# Patient Record
Sex: FEMALE | Race: WHITE | NOT HISPANIC OR LATINO | Employment: UNEMPLOYED | URBAN - METROPOLITAN AREA
[De-identification: names, ages, dates, MRNs, and addresses within clinical notes are randomized per-mention and may not be internally consistent; named-entity substitution may affect disease eponyms.]

---

## 2017-11-10 ENCOUNTER — HOSPITAL ENCOUNTER (OUTPATIENT)
Dept: ULTRASOUND IMAGING | Facility: HOSPITAL | Age: 27
Discharge: HOME | End: 2017-11-10
Attending: NURSE PRACTITIONER | Admitting: NURSE PRACTITIONER

## 2018-01-03 ENCOUNTER — CONVERSION ENCOUNTER (OUTPATIENT)
Dept: BEHAVIORAL HEALTH | Facility: OTHER | Age: 28
End: 2018-01-03

## 2019-06-04 VITALS
DIASTOLIC BLOOD PRESSURE: 73 MMHG | SYSTOLIC BLOOD PRESSURE: 114 MMHG | HEIGHT: 70 IN | HEART RATE: 100 BPM | WEIGHT: 293 LBS | BODY MASS INDEX: 41.95 KG/M2 | OXYGEN SATURATION: 99 %

## 2019-06-27 ENCOUNTER — OFFICE VISIT (OUTPATIENT)
Dept: PSYCHIATRY | Facility: CLINIC | Age: 29
End: 2019-06-27

## 2019-06-27 DIAGNOSIS — F43.12 CHRONIC POST-TRAUMATIC STRESS DISORDER (PTSD): ICD-10-CM

## 2019-06-27 DIAGNOSIS — F48.2 PSEUDOBULBAR AFFECT: ICD-10-CM

## 2019-06-27 DIAGNOSIS — F51.05 INSOMNIA DUE TO OTHER MENTAL DISORDER: ICD-10-CM

## 2019-06-27 DIAGNOSIS — F99 INSOMNIA DUE TO OTHER MENTAL DISORDER: ICD-10-CM

## 2019-06-27 DIAGNOSIS — F31.5 BIPOLAR I DISORDER, MOST RECENT EPISODE (OR CURRENT) DEPRESSED, SEVERE, SPECIFIED AS WITH PSYCHOTIC BEHAVIOR (HCC): Primary | ICD-10-CM

## 2019-06-27 PROCEDURE — 99214 OFFICE O/P EST MOD 30 MIN: CPT | Performed by: PSYCHIATRY & NEUROLOGY

## 2019-06-27 RX ORDER — NAPROXEN 500 MG/1
TABLET ORAL
COMMUNITY
Start: 2015-04-22 | End: 2020-04-14

## 2019-06-27 RX ORDER — CLONAZEPAM 1 MG/1
1 TABLET ORAL 2 TIMES DAILY PRN
Qty: 60 TABLET | Refills: 2 | Status: SHIPPED | OUTPATIENT
Start: 2019-06-27 | End: 2019-09-25 | Stop reason: SDUPTHER

## 2019-06-27 RX ORDER — CLONAZEPAM 2 MG/1
TABLET ORAL
COMMUNITY
Start: 2018-01-03 | End: 2019-06-27

## 2019-06-27 RX ORDER — GABAPENTIN 300 MG/1
300 CAPSULE ORAL 3 TIMES DAILY
Qty: 90 CAPSULE | Refills: 2 | Status: SHIPPED | OUTPATIENT
Start: 2019-06-27 | End: 2019-08-29

## 2019-06-27 RX ORDER — BUPROPION HYDROCHLORIDE 150 MG/1
TABLET ORAL EVERY 24 HOURS
COMMUNITY
Start: 2018-11-06 | End: 2019-06-27

## 2019-06-27 RX ORDER — OMEPRAZOLE 40 MG/1
CAPSULE, DELAYED RELEASE ORAL
COMMUNITY
Start: 2017-11-13 | End: 2023-03-27 | Stop reason: SDUPTHER

## 2019-06-27 RX ORDER — BUPROPION HYDROCHLORIDE 300 MG/1
TABLET ORAL EVERY 24 HOURS
COMMUNITY
Start: 2018-07-31 | End: 2019-06-27

## 2019-06-27 RX ORDER — BENZONATATE 100 MG/1
CAPSULE ORAL
Refills: 0 | COMMUNITY
Start: 2019-05-05 | End: 2020-04-14

## 2019-06-27 RX ORDER — GABAPENTIN 300 MG/1
300 CAPSULE ORAL 3 TIMES DAILY
Refills: 0 | COMMUNITY
Start: 2019-05-27 | End: 2019-06-27 | Stop reason: SDUPTHER

## 2019-06-27 RX ORDER — DOXYCYCLINE 100 MG/1
100 CAPSULE ORAL 2 TIMES DAILY
Refills: 0 | COMMUNITY
Start: 2019-05-31 | End: 2020-04-14

## 2019-06-27 RX ORDER — MECLIZINE HYDROCHLORIDE 25 MG/1
25 TABLET ORAL EVERY 6 HOURS PRN
Refills: 0 | COMMUNITY
Start: 2019-04-02 | End: 2020-04-14

## 2019-06-27 RX ORDER — ALBUTEROL SULFATE 90 UG/1
AEROSOL, METERED RESPIRATORY (INHALATION)
Refills: 0 | COMMUNITY
Start: 2019-05-05 | End: 2020-04-14
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
## 2019-06-27 NOTE — PATIENT INSTRUCTIONS
Bipolar 1 Disorder  Bipolar 1 disorder is a mental health disorder in which a person has episodes of emotional highs (kimberly), and may also have episodes of emotional lows (depression) in addition to highs. Bipolar 1 disorder is different from other bipolar disorders because it involves extreme manic episodes. These episodes last at least one week or involve symptoms that are so severe that hospitalization is needed to keep the person safe.  What increases the risk?  The cause of this condition is not known. However, certain factors make you more likely to have bipolar disorder, such as:  · Having a family member with the disorder.  · An imbalance of certain chemicals in the brain (neurotransmitters).  · Stress, such as illness, financial problems, or a death.  · Certain conditions that affect the brain or spinal cord (neurologic conditions).  · Brain injury (trauma).  · Having another mental health disorder, such as:  ? Obsessive compulsive disorder.  ? Schizophrenia.    What are the signs or symptoms?  Symptoms of kimberly include:  · Very high self-esteem or self-confidence.  · Decreased need for sleep.  · Unusual talkativeness or feeling a need to keep talking. Speech may be very fast. It may seem like you cannot stop talking.  · Racing thoughts or constant talking, with quick shifts between topics that may or may not be related (flight of ideas).  · Decreased ability to focus or concentrate.  · Increased purposeful activity, such as work, studies, or social activity.  · Increased nonproductive activity. This could be pacing, squirming and fidgeting, or finger and toe tapping.  · Impulsive behavior and poor judgment. This may result in high-risk activities, such as having unprotected sex or spending a lot of money.    Symptoms of depression include:  · Feeling sad, hopeless, or helpless.  · Frequent or uncontrollable crying.  · Lack of feeling or caring about anything.  · Sleeping too much.  · Moving more slowly  than usual.  · Not being able to enjoy things you used to enjoy.  · Wanting to be alone all the time.  · Feeling guilty or worthless.  · Lack of energy or motivation.  · Trouble concentrating or remembering.  · Trouble making decisions.  · Increased appetite.  · Thoughts of death, or the desire to harm yourself.    Sometimes, you may have a mixed mood. This means having symptoms of depression and kimberly. Stress can make symptoms worse.  How is this diagnosed?  To diagnose bipolar disorder, your health care provider may ask about your:  · Emotional episodes.  · Medical history.  · Alcohol and drug use. This includes prescription medicines. Certain medical conditions and substances can cause symptoms that seem like bipolar disorder (secondary bipolar disorder).    How is this treated?  Bipolar disorder is a long-term (chronic) illness. It is best controlled with ongoing (continuous) treatment rather than treatment only when symptoms occur. Treatment may include:  · Medicine. Medicine can be prescribed by a provider who specializes in treating mental disorders (psychiatrist).  ? Medicines called mood stabilizers are usually prescribed.  ? If symptoms occur even while taking a mood stabilizer, other medicines may be added.  · Psychotherapy. Some forms of talk therapy, such as cognitive-behavioral therapy (CBT), can provide support, education, and guidance.  · Coping methods, such as journaling or relaxation exercises. These may include:  ? Yoga.  ? Meditation.  ? Deep breathing.  · Lifestyle changes, such as:  ? Limiting alcohol and drug use.  ? Exercising regularly.  ? Getting plenty of sleep.  ? Making healthy eating choices.    A combination of medicine, talk therapy, and coping methods is best. A procedure in which electricity is applied to the brain through the scalp (electroconvulsive therapy) may be used in cases of severe kimberly when medicine and psychotherapy work too slowly or do not work.  Follow these  instructions at home:  Activity    · Return to your normal activities as told by your health care provider.  · Find activities that you enjoy, and make time to do them.  · Exercise regularly as told by your health care provider.  Lifestyle  · Limit alcohol intake to no more than 1 drink a day for nonpregnant women and 2 drinks a day for men. One drink equals 12 oz of beer, 5 oz of wine, or 1½ oz of hard liquor.  · Follow a set schedule for eating and sleeping.  · Eat a balanced diet that includes fresh fruits and vegetables, whole grains, low-fat dairy, and lean meat.  · Get 7-8 hours of sleep each night.  General instructions  · Take over-the-counter and prescription medicines only as told by your health care provider.  · Think about joining a support group. Your health care provider may be able to recommend a support group.  · Talk with your family and loved ones about your treatment goals and how they can help.  · Keep all follow-up visits as told by your health care provider. This is important.  Where to find more information  For more information about bipolar disorder, visit the following websites:  · National [place] on Mental Illness: www.agustín.org  · [place] [employer] [place] of [employer]: www.nimh.nih.gov    Contact a health care provider if:  · Your symptoms get worse.  · You have side effects from your medicine, and they get worse.  · You have trouble sleeping.  · You have trouble doing daily activities.  · You feel unsafe in your surroundings.  · You are dealing with substance abuse.  Get help right away if:  · You have new symptoms.  · You have thoughts about harming yourself.  · You self-harm.  This information is not intended to replace advice given to you by your health care provider. Make sure you discuss any questions you have with your health care provider.  Document Released: 03/26/2002 Document Revised: 08/13/2017 Document Reviewed: 08/17/2017  Elsevier Interactive Patient Education © 2019  [employer]

## 2019-07-19 ENCOUNTER — APPOINTMENT (OUTPATIENT)
Dept: GENERAL RADIOLOGY | Facility: HOSPITAL | Age: 29
End: 2019-07-19

## 2019-07-19 ENCOUNTER — HOSPITAL ENCOUNTER (EMERGENCY)
Facility: HOSPITAL | Age: 29
Discharge: HOME | End: 2019-07-19
Admitting: EMERGENCY MEDICINE

## 2019-07-19 VITALS
SYSTOLIC BLOOD PRESSURE: 130 MMHG | WEIGHT: 284.39 LBS | DIASTOLIC BLOOD PRESSURE: 83 MMHG | HEART RATE: 98 BPM | RESPIRATION RATE: 16 BRPM | TEMPERATURE: 98.2 F | OXYGEN SATURATION: 98 % | HEIGHT: 70 IN | BODY MASS INDEX: 40.71 KG/M2

## 2019-07-19 DIAGNOSIS — S93.402A SPRAIN OF LEFT ANKLE, UNSPECIFIED LIGAMENT, INITIAL ENCOUNTER: Primary | ICD-10-CM

## 2019-07-19 PROCEDURE — 99283 EMERGENCY DEPT VISIT LOW MDM: CPT

## 2019-07-19 PROCEDURE — 73610 X-RAY EXAM OF ANKLE: CPT

## 2019-07-19 PROCEDURE — 73630 X-RAY EXAM OF FOOT: CPT

## 2019-08-20 ENCOUNTER — TELEPHONE (OUTPATIENT)
Dept: PSYCHIATRY | Facility: CLINIC | Age: 29
End: 2019-08-20

## 2019-08-29 DIAGNOSIS — F31.5 BIPOLAR I DISORDER, MOST RECENT EPISODE (OR CURRENT) DEPRESSED, SEVERE, SPECIFIED AS WITH PSYCHOTIC BEHAVIOR (HCC): ICD-10-CM

## 2019-08-29 RX ORDER — QUETIAPINE FUMARATE 300 MG/1
600 TABLET, FILM COATED ORAL NIGHTLY
Qty: 60 TABLET | Refills: 0 | Status: SHIPPED | OUTPATIENT
Start: 2019-08-29 | End: 2019-09-25 | Stop reason: SDUPTHER

## 2019-08-29 RX ORDER — TRAZODONE HYDROCHLORIDE 50 MG/1
50 TABLET ORAL NIGHTLY
COMMUNITY
End: 2019-08-29 | Stop reason: SDUPTHER

## 2019-08-29 RX ORDER — QUETIAPINE FUMARATE 300 MG/1
600 TABLET, FILM COATED ORAL NIGHTLY
COMMUNITY
End: 2019-08-29 | Stop reason: SDUPTHER

## 2019-08-29 RX ORDER — TRAZODONE HYDROCHLORIDE 50 MG/1
50 TABLET ORAL NIGHTLY
Qty: 30 TABLET | Refills: 0 | Status: SHIPPED | OUTPATIENT
Start: 2019-08-29 | End: 2019-09-25 | Stop reason: SDUPTHER

## 2019-08-29 RX ORDER — GABAPENTIN 300 MG/1
600 CAPSULE ORAL 3 TIMES DAILY
Qty: 90 CAPSULE | Refills: 2 | Status: SHIPPED | OUTPATIENT
Start: 2019-08-29 | End: 2019-09-25 | Stop reason: SDUPTHER

## 2019-08-29 NOTE — TELEPHONE ENCOUNTER
Patient was just discharged from [facility] and changed her meds. Discharge summary was imported to you and I have also pended the meds that were change. Patient is needing refills due to insurance not taking her doctor? Please advise

## 2019-09-09 ENCOUNTER — TELEPHONE (OUTPATIENT)
Dept: PSYCHIATRY | Facility: CLINIC | Age: 29
End: 2019-09-09

## 2019-09-21 DIAGNOSIS — F43.12 CHRONIC POST-TRAUMATIC STRESS DISORDER (PTSD): ICD-10-CM

## 2019-09-24 ENCOUNTER — APPOINTMENT (OUTPATIENT)
Dept: GENERAL RADIOLOGY | Facility: HOSPITAL | Age: 29
End: 2019-09-24

## 2019-09-24 ENCOUNTER — HOSPITAL ENCOUNTER (EMERGENCY)
Facility: HOSPITAL | Age: 29
Discharge: HOME | End: 2019-09-24
Attending: EMERGENCY MEDICINE | Admitting: EMERGENCY MEDICINE

## 2019-09-24 ENCOUNTER — APPOINTMENT (OUTPATIENT)
Dept: MRI IMAGING | Facility: HOSPITAL | Age: 29
End: 2019-09-24

## 2019-09-24 VITALS
RESPIRATION RATE: 16 BRPM | WEIGHT: 289.24 LBS | OXYGEN SATURATION: 99 % | HEART RATE: 72 BPM | HEIGHT: 70 IN | BODY MASS INDEX: 41.41 KG/M2 | DIASTOLIC BLOOD PRESSURE: 58 MMHG | SYSTOLIC BLOOD PRESSURE: 108 MMHG | TEMPERATURE: 98 F

## 2019-09-24 DIAGNOSIS — R20.2 PARESTHESIAS: Primary | ICD-10-CM

## 2019-09-24 LAB
ABO GROUP BLD: NORMAL
ALBUMIN SERPL-MCNC: 3.4 G/DL (ref 3.5–4.8)
ALBUMIN/GLOB SERPL: 1.1 G/DL (ref 1–1.7)
ALP SERPL-CCNC: 61 U/L (ref 32–91)
ALT SERPL W P-5'-P-CCNC: 21 U/L (ref 14–54)
ANION GAP SERPL CALCULATED.3IONS-SCNC: 12.7 MMOL/L (ref 5–15)
AST SERPL-CCNC: 18 U/L (ref 15–41)
BASOPHILS # BLD AUTO: 0.1 10*3/MM3 (ref 0–0.2)
BASOPHILS NFR BLD AUTO: 0.7 % (ref 0–1.5)
BILIRUB SERPL-MCNC: 0.4 MG/DL (ref 0.3–1.2)
BLD GP AB SCN SERPL QL: NEGATIVE
BUN BLD-MCNC: 11 MG/DL (ref 8–20)
BUN/CREAT SERPL: 12.2 (ref 5.4–26.2)
CALCIUM SPEC-SCNC: 8.7 MG/DL (ref 8.9–10.3)
CHLORIDE SERPL-SCNC: 107 MMOL/L (ref 101–111)
CO2 SERPL-SCNC: 25 MMOL/L (ref 22–32)
CREAT BLD-MCNC: 0.9 MG/DL (ref 0.4–1)
DEPRECATED RDW RBC AUTO: 44.6 FL (ref 37–54)
EOSINOPHIL # BLD AUTO: 0.2 10*3/MM3 (ref 0–0.4)
EOSINOPHIL NFR BLD AUTO: 2.5 % (ref 0.3–6.2)
ERYTHROCYTE [DISTWIDTH] IN BLOOD BY AUTOMATED COUNT: 14.2 % (ref 12.3–15.4)
GFR SERPL CREATININE-BSD FRML MDRD: 74 ML/MIN/1.73
GLOBULIN UR ELPH-MCNC: 3.1 GM/DL (ref 2.5–3.8)
GLUCOSE BLD-MCNC: 108 MG/DL (ref 65–99)
GLUCOSE BLDC GLUCOMTR-MCNC: 116 MG/DL (ref 70–105)
HCT VFR BLD AUTO: 42.5 % (ref 34–46.6)
HGB BLD-MCNC: 14.6 G/DL (ref 12–15.9)
HOLD SPECIMEN: NORMAL
HOLD SPECIMEN: NORMAL
INR PPP: 0.9 (ref 0.9–1.1)
LYMPHOCYTES # BLD AUTO: 2.4 10*3/MM3 (ref 0.7–3.1)
LYMPHOCYTES NFR BLD AUTO: 24.6 % (ref 19.6–45.3)
MAGNESIUM SERPL-MCNC: 2 MG/DL (ref 1.8–2.5)
MCH RBC QN AUTO: 30.6 PG (ref 26.6–33)
MCHC RBC AUTO-ENTMCNC: 34.4 G/DL (ref 31.5–35.7)
MCV RBC AUTO: 89.1 FL (ref 79–97)
MONOCYTES # BLD AUTO: 0.6 10*3/MM3 (ref 0.1–0.9)
MONOCYTES NFR BLD AUTO: 6.3 % (ref 5–12)
NEUTROPHILS # BLD AUTO: 6.4 10*3/MM3 (ref 1.7–7)
NEUTROPHILS NFR BLD AUTO: 65.9 % (ref 42.7–76)
NRBC BLD AUTO-RTO: 0 /100 WBC (ref 0–0.2)
PLATELET # BLD AUTO: 229 10*3/MM3 (ref 140–450)
PMV BLD AUTO: 7.4 FL (ref 6–12)
POTASSIUM BLD-SCNC: 3.7 MMOL/L (ref 3.6–5.1)
PROT SERPL-MCNC: 6.5 G/DL (ref 6.1–7.9)
PROTHROMBIN TIME: 9.7 SECONDS (ref 9.6–11.7)
RBC # BLD AUTO: 4.77 10*6/MM3 (ref 3.77–5.28)
RH BLD: NEGATIVE
SODIUM BLD-SCNC: 141 MMOL/L (ref 136–144)
T&S EXPIRATION DATE: NORMAL
TROPONIN I SERPL-MCNC: <0.03 NG/ML (ref 0–0.03)
TSH SERPL DL<=0.05 MIU/L-ACNC: 0.67 UIU/ML (ref 0.34–5.6)
WBC NRBC COR # BLD: 9.6 10*3/MM3 (ref 3.4–10.8)
WHOLE BLOOD HOLD SPECIMEN: NORMAL
WHOLE BLOOD HOLD SPECIMEN: NORMAL

## 2019-09-24 PROCEDURE — 71045 X-RAY EXAM CHEST 1 VIEW: CPT

## 2019-09-24 PROCEDURE — 85610 PROTHROMBIN TIME: CPT | Performed by: NURSE PRACTITIONER

## 2019-09-24 PROCEDURE — 93005 ELECTROCARDIOGRAM TRACING: CPT | Performed by: NURSE PRACTITIONER

## 2019-09-24 PROCEDURE — 99284 EMERGENCY DEPT VISIT MOD MDM: CPT

## 2019-09-24 PROCEDURE — 85025 COMPLETE CBC W/AUTO DIFF WBC: CPT | Performed by: NURSE PRACTITIONER

## 2019-09-24 PROCEDURE — 80053 COMPREHEN METABOLIC PANEL: CPT | Performed by: NURSE PRACTITIONER

## 2019-09-24 PROCEDURE — 86901 BLOOD TYPING SEROLOGIC RH(D): CPT

## 2019-09-24 PROCEDURE — 86901 BLOOD TYPING SEROLOGIC RH(D): CPT | Performed by: NURSE PRACTITIONER

## 2019-09-24 PROCEDURE — 84443 ASSAY THYROID STIM HORMONE: CPT | Performed by: EMERGENCY MEDICINE

## 2019-09-24 PROCEDURE — 82962 GLUCOSE BLOOD TEST: CPT

## 2019-09-24 PROCEDURE — 86900 BLOOD TYPING SEROLOGIC ABO: CPT | Performed by: NURSE PRACTITIONER

## 2019-09-24 PROCEDURE — 84484 ASSAY OF TROPONIN QUANT: CPT | Performed by: EMERGENCY MEDICINE

## 2019-09-24 PROCEDURE — 86900 BLOOD TYPING SEROLOGIC ABO: CPT

## 2019-09-24 PROCEDURE — 83735 ASSAY OF MAGNESIUM: CPT | Performed by: EMERGENCY MEDICINE

## 2019-09-24 PROCEDURE — 70551 MRI BRAIN STEM W/O DYE: CPT

## 2019-09-24 PROCEDURE — 86850 RBC ANTIBODY SCREEN: CPT | Performed by: NURSE PRACTITIONER

## 2019-09-24 RX ORDER — CLONAZEPAM 1 MG/1
TABLET ORAL
Qty: 60 TABLET | OUTPATIENT
Start: 2019-09-24

## 2019-09-24 RX ORDER — SODIUM CHLORIDE 0.9 % (FLUSH) 0.9 %
10 SYRINGE (ML) INJECTION AS NEEDED
Status: DISCONTINUED | OUTPATIENT
Start: 2019-09-24 | End: 2019-09-24 | Stop reason: HOSPADM

## 2019-09-24 NOTE — DISCHARGE INSTRUCTIONS
Follow-up primary care doctor Dr. Naylor [id number] next week.  Return for weakness to extremities facial asymmetry speech difficulty vomiting chest pain passing out fevers redness swelling rash loss bladder bowel control or any other new or worse problems or concerns

## 2019-09-24 NOTE — ED PROVIDER NOTES
Subjective   Chief complaint left arm numbness and total body numbness    History of present illness 29-year-old female states her left arm is been numb for a week constantly and then this morning she was lightheaded and her whole body went numb for about 15 minutes.  Was no paralysis she was able to stand and walk and function normally there was no visual changes she did not try to talk so she is unsure of speech but she was able to walk and use her arms and legs without difficulty.  There was no chest pain no neck pain or back pain no fever chills no headache or visual changes she denies any recent injury illness foreign travels antibiotic use recent hospitalizations.  There is been no paralysis to her left arm for the whole week it has been.  She is failed to use it and function normally.  There is no loss of bladder bowel control.  Symptoms are moderate but nothing makes it better worse.  Denies any tick bites or rashes no urinary problems no bowel problems no pregnancy concerns            Review of Systems   Constitutional: Negative for chills and fever.   HENT: Negative for congestion and sinus pressure.    Eyes: Negative for photophobia and visual disturbance.   Respiratory: Negative for chest tightness and shortness of breath.    Cardiovascular: Negative for chest pain and leg swelling.   Gastrointestinal: Negative for abdominal pain and vomiting.   Endocrine: Negative for cold intolerance and heat intolerance.   Genitourinary: Negative for difficulty urinating and dysuria.   Musculoskeletal: Negative for arthralgias and back pain.   Skin: Negative for color change and pallor.   Neurological: Positive for dizziness, light-headedness and numbness. Negative for facial asymmetry, speech difficulty, weakness and headaches.   Psychiatric/Behavioral: Negative for agitation and behavioral problems.       Past Medical History:   Diagnosis Date   • Anxiety    • Bipolar disorder (CMS/[facility])    • Depression    • DJD  (degenerative joint disease)    • Panic disorder     Abstraction from [facility] Psycosis   • Thyroid nodule        Allergies   Allergen Reactions   • Adhesive Tape Hives   • Latex Swelling       Past Surgical History:   Procedure Laterality Date   • ABDOMINAL SURGERY     •  SECTION      x 2   • CHOLECYSTECTOMY     • GASTRECTOMY         Family History   Problem Relation Age of Onset   • Diabetes Other    • Hypertension Other    • Cancer Other         Colon, Breast, Uterine Cancer       Social History     Socioeconomic History   • Marital status:      Spouse name: Not on file   • Number of children: Not on file   • Years of education: Not on file   • Highest education level: Not on file   Tobacco Use   • Smoking status: Current Every Day Smoker     Packs/day: 1.00     Types: Cigarettes   Substance and Sexual Activity   • Alcohol use: Yes   • Drug use: Yes     Types: Marijuana   • Sexual activity: Not Currently     Prior to Admission medications    Medication Sig Start Date End Date Taking? Authorizing Provider   albuterol sulfate  (90 Base) MCG/ACT inhaler INHALE 1 PUFF BY MOUTH EVERY 6 HOURS 19   Javy Landa MD   benzonatate (TESSALON) 100 MG capsule TAKE 1 CAPSULE BY MOUTH 3 TIMES PER DAY AS NEEDED FOR COUGH 19   Javy Landa MD   Cariprazine HCl (VRAYLAR) 3 MG capsule Take 1 capsule by mouth Every Morning. 19   Marialuisa Rossi MD   Celecoxib (CELEBREX PO) Daily. 1/3/18   Javy Landa MD   clonazePAM (KLONOPIN) 1 MG tablet Take 1 tablet by mouth 2 (Two) Times a Day As Needed for Seizures. 19   Marialuisa Rossi MD   doxycycline (MONODOX) 100 MG capsule Take 100 mg by mouth 2 (Two) Times a Day. 19   Javy Landa MD   gabapentin (NEURONTIN) 300 MG capsule Take 2 capsules by mouth 3 (Three) Times a Day. 19   Marialuisa Rossi MD   meclizine (ANTIVERT) 25 MG tablet Take 25 mg by mouth Every 6 (Six) Hours As Needed. 19    Provider, MD Javy   naproxen (NAPROSYN) 500 MG tablet NAPROXEN 500 MG TABS 4/22/15   Javy Landa MD   Omeprazole 20 MG tablet delayed-release OMEPRAZOLE 20 MG TBEC 11/13/17   Javy Landa MD   QUEtiapine (SEROquel) 300 MG tablet Take 2 tablets by mouth Every Night. 8/29/19   Marialuisa Rossi MD   traZODone (DESYREL) 50 MG tablet Take 1 tablet by mouth Every Night. 8/29/19   Marialuisa Rossi MD           Objective   Physical Exam  29-year-old female awake alert no distress HEENT extraocular muscles intact pupils equal and react there is no photophobia disks are sharp mouth is clear neck is supple no adenopathy no JVD no bruits lungs clear no retractions heart regular without murmur abdomen soft without tenderness no masses extremities pulses are equal throughout upper and lower extremities no edema cords or Homans sign or evidence of DVT.  Patient's awake alert orientated x4 no facial asymmetry normal speech no drift the arms or legs normal finger-to-nose no ataxia.  No focal weakness NIH stroke scale of 0 currently.  Procedures           ED Course      Results for orders placed or performed during the hospital encounter of 09/24/19   Comprehensive Metabolic Panel   Result Value Ref Range    Glucose 108 (H) 65 - 99 mg/dL    BUN 11 8 - 20 mg/dL    Creatinine 0.90 0.40 - 1.00 mg/dL    Sodium 141 136 - 144 mmol/L    Potassium 3.7 3.6 - 5.1 mmol/L    Chloride 107 101 - 111 mmol/L    CO2 25.0 22.0 - 32.0 mmol/L    Calcium 8.7 (L) 8.9 - 10.3 mg/dL    Total Protein 6.5 6.1 - 7.9 g/dL    Albumin 3.40 (L) 3.50 - 4.80 g/dL    ALT (SGPT) 21 14 - 54 U/L    AST (SGOT) 18 15 - 41 U/L    Alkaline Phosphatase 61 32 - 91 U/L    Total Bilirubin 0.4 0.3 - 1.2 mg/dL    eGFR Non African Amer 74 >60 mL/min/1.73    Globulin 3.1 2.5 - 3.8 gm/dL    A/G Ratio 1.1 1.0 - 1.7 g/dL    BUN/Creatinine Ratio 12.2 5.4 - 26.2    Anion Gap 12.7 5.0 - 15.0 mmol/L   Protime-INR   Result Value Ref Range    Protime 9.7 9.6 -  11.7 Seconds    INR 0.90 0.90 - 1.10   CBC Auto Differential   Result Value Ref Range    WBC 9.60 3.40 - 10.80 10*3/mm3    RBC 4.77 3.77 - 5.28 10*6/mm3    Hemoglobin 14.6 12.0 - 15.9 g/dL    Hematocrit 42.5 34.0 - 46.6 %    MCV 89.1 79.0 - 97.0 fL    MCH 30.6 26.6 - 33.0 pg    MCHC 34.4 31.5 - 35.7 g/dL    RDW 14.2 12.3 - 15.4 %    RDW-SD 44.6 37.0 - 54.0 fl    MPV 7.4 6.0 - 12.0 fL    Platelets 229 140 - 450 10*3/mm3    Neutrophil % 65.9 42.7 - 76.0 %    Lymphocyte % 24.6 19.6 - 45.3 %    Monocyte % 6.3 5.0 - 12.0 %    Eosinophil % 2.5 0.3 - 6.2 %    Basophil % 0.7 0.0 - 1.5 %    Neutrophils, Absolute 6.40 1.70 - 7.00 10*3/mm3    Lymphocytes, Absolute 2.40 0.70 - 3.10 10*3/mm3    Monocytes, Absolute 0.60 0.10 - 0.90 10*3/mm3    Eosinophils, Absolute 0.20 0.00 - 0.40 10*3/mm3    Basophils, Absolute 0.10 0.00 - 0.20 10*3/mm3    nRBC 0.0 0.0 - 0.2 /100 WBC   Magnesium   Result Value Ref Range    Magnesium 2.0 1.8 - 2.5 mg/dL   TSH   Result Value Ref Range    TSH 0.670 0.340 - 5.600 uIU/mL   Troponin   Result Value Ref Range    Troponin I <0.030 0.000 - 0.030 ng/mL   POC Glucose Once   Result Value Ref Range    Glucose 116 (H) 70 - 105 mg/dL   Type & Screen   Result Value Ref Range    ABO Type B     RH type Negative     Antibody Screen Negative     T&S Expiration Date 9/27/2019 11:59:59 PM    Light Blue Top   Result Value Ref Range    Extra Tube hold for add-on    Green Top (Gel)   Result Value Ref Range    Extra Tube Hold for add-ons.    Lavender Top   Result Value Ref Range    Extra Tube hold for add-on    Gold Top - SST   Result Value Ref Range    Extra Tube Hold for add-ons.      Mri Brain Without Contrast    Result Date: 9/24/2019  No acute brain abnormality is seen.  Electronically Signed By-Dr. Juan Manuel Marinelli MD On:9/24/2019 5:40 PM This report was finalized on [id number] by Dr. Juan Manuel Marinelli MD.    Xr Chest 1 View    Result Date: 9/24/2019  No acute cardiopulmonary process.  Electronically Signed By-Carmela  Festus On:9/24/2019 4:06 PM This report was finalized on [id number] by  Carmela Mortensen, .    Medications   sodium chloride 0.9 % flush 10 mL (not administered)       EKG my interpretation normal sinus rhythm rate 78 normal axis no hypertrophy QTC of 4 and 39 normal EKG            MDM  Number of Diagnoses or Management Options  Diagnosis management comments: Medical decision make.  Patient IV established placed on a monitor and had the above exam and evaluation EKG normal troponin normal CBC electrolytes magnesium normal MRI brain showed no acute stroke.  Chest x-ray unremarkable.  Patient repeat examination at 6:25 PM was resting currently she has no focal deficits awake alert follows commands no facial symmetry normal speech no weakness in extremities she has no current symptoms other than her left arm feels numb is not red hot or swollen is good pulses and full range of motion is no evidence of DVT or cellulitis there is no pain in the neck there is no evidence of vascular compromise and she looks well otherwise I see no evidence of acute stroke no evidence of acute DVT or acute cardiac ischemia patient stable she will be followed up as outpatient and we talked about what to return for.       Amount and/or Complexity of Data Reviewed  Clinical lab tests: reviewed  Tests in the radiology section of CPT®: reviewed        Final diagnoses:   Paresthesias              Vamsi Schrader MD  09/24/19 9028

## 2019-09-24 NOTE — ED NOTES
"Pt states \"My whole body went numb and I was really dizzy\" she states \"My left arm's been numb and tingling about a week\" Denies any syncopal episodes. States her mouth and left arm are numb and tingling and is still dizzy from this morning. Says the dizziness gets intermittently gets worse and better. C/O left shoulder pain but does not think it's relevant. States she had some med changes \"about 1 month ago or a maybe even a little longer\". Started new scripts forTrazodone and Seroquel, upped her Neurontin dose from 300mg 3x day to 600 mg 3x day, and Klonopin dose stayed the same. C/O headache but states she always has a headache and it's not out of the normal.     Jen Hernandez RN  09/24/19 [id number]    "

## 2019-09-25 ENCOUNTER — OFFICE VISIT (OUTPATIENT)
Dept: PSYCHIATRY | Facility: CLINIC | Age: 29
End: 2019-09-25

## 2019-09-25 DIAGNOSIS — F31.5 BIPOLAR I DISORDER, MOST RECENT EPISODE (OR CURRENT) DEPRESSED, SEVERE, SPECIFIED AS WITH PSYCHOTIC BEHAVIOR (HCC): ICD-10-CM

## 2019-09-25 DIAGNOSIS — F25.0 SCHIZOAFFECTIVE DISORDER, BIPOLAR TYPE (HCC): Primary | ICD-10-CM

## 2019-09-25 DIAGNOSIS — F99 INSOMNIA DUE TO OTHER MENTAL DISORDER: ICD-10-CM

## 2019-09-25 DIAGNOSIS — F48.2 PSEUDOBULBAR AFFECT: ICD-10-CM

## 2019-09-25 DIAGNOSIS — F51.05 INSOMNIA DUE TO OTHER MENTAL DISORDER: ICD-10-CM

## 2019-09-25 DIAGNOSIS — F43.12 CHRONIC POST-TRAUMATIC STRESS DISORDER (PTSD): ICD-10-CM

## 2019-09-25 PROCEDURE — 99213 OFFICE O/P EST LOW 20 MIN: CPT | Performed by: PSYCHIATRY & NEUROLOGY

## 2019-09-25 RX ORDER — GABAPENTIN 300 MG/1
600 CAPSULE ORAL 3 TIMES DAILY
Qty: 90 CAPSULE | Refills: 2 | Status: SHIPPED | OUTPATIENT
Start: 2019-09-25 | End: 2019-11-02 | Stop reason: SDUPTHER

## 2019-09-25 RX ORDER — CLONAZEPAM 1 MG/1
1 TABLET ORAL 2 TIMES DAILY PRN
Qty: 60 TABLET | Refills: 2 | Status: SHIPPED | OUTPATIENT
Start: 2019-09-25 | End: 2019-12-18 | Stop reason: SDUPTHER

## 2019-09-25 RX ORDER — TRAZODONE HYDROCHLORIDE 50 MG/1
50 TABLET ORAL NIGHTLY
Qty: 30 TABLET | Refills: 0 | Status: SHIPPED | OUTPATIENT
Start: 2019-09-25 | End: 2019-11-19 | Stop reason: SDUPTHER

## 2019-09-25 RX ORDER — QUETIAPINE FUMARATE 300 MG/1
600 TABLET, FILM COATED ORAL NIGHTLY
Qty: 60 TABLET | Refills: 2 | Status: SHIPPED | OUTPATIENT
Start: 2019-09-25 | End: 2020-01-08 | Stop reason: SDUPTHER
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
## 2019-09-25 NOTE — PATIENT INSTRUCTIONS
Living With Schizoaffective Disorder  If you have been diagnosed with schizoaffective disorder (ScAD), you may be relieved to know why you have felt or behaved a certain way. You may also feel overwhelmed about the treatment ahead, how to get the support you need, and how to deal with the condition day-to-day. With care and support, you can learn to manage your symptoms and live with ScAD.  ScAD is a lifelong chronic condition that may occur in cycle. Periods of severe symptoms may be followed by periods of less severe symptoms or improvement. If you are living with ScAD, there are steps that you can take to help manage the condition and make your life better.  How to manage lifestyle changes  Managing stress  Stress is your body's reaction to life changes and events, both good and bad. For people with ScAD, stress can cause more severe symptoms to start (can be a trigger), so it is important to learn ways to deal with stress. Your health care provider, therapist, or counselor may suggest techniques such as:  · Meditation, muscle relaxation, and breathing exercises.  · Music therapy. This can include creating music or listening to music.  · Life skills training. This training is focused on work, self-care, money, house management, and social skills.  Other things you can do to cope with stress include:  · Keeping a stress diary. This can help you learn what causes your stress to start and how you can control your response to those triggers.  · Exercising. Even a short daily walk can help.  · Getting enough sleep.  · Making a schedule to manage your time. Knowing what you will do from day to day helps you avoid feeling overwhelmed by tasks and deadlines.  · Spending time on hobbies you enjoy that help you relax.    Medicines  Your health care provider is likely to prescribe various types of medicine depending on your symptoms. These may include one or more of the following types:  · Antipsychotics.  · Mood  stabilizers.  · Antidepressants.  Make sure you:  · Talk with your pharmacist or health care provider about all medicines that you take, the possible side effects, and which medicines are safe to take together.  · Make it your goal to take part in all treatment decisions (shared decision-making). Ask about possible side effects of medicines that your health care provider recommends, and tell him or her how you feel about having those side effects. It is best if shared decision-making with your health care provider is part of your total treatment plan.  Relationships  Having the support of your family and friends can play a major role in the success of your treatment. The following steps can help you maintain healthy relationships:  · Think about going to couples therapy, family therapy, or family education classes.  · Create a written plan for your treatment, and include close family members and friends in the process.  · Consider bringing your partner or another family member or friend to the appointments you have with your health care provider.  How to recognize changes in your condition  If you find that your condition is getting worse, talk to your health care provider right away. Watch for these signs:  · Your mood becomes extreme with either emotional highs or the intense lows of depression.  · Your speech becomes unclear.  · You are disorganized, show the wrong social behaviors, or withdraw from social activities.  · You have racing thoughts and have trouble thinking clearly or staying focused.  · You hear, see, taste, and believe things that others do not.  · You have poor personal hygiene, weight gain or weight loss, or changes in how you are sleeping or eating.  Where to find support  Talking to others  · Reach out to trusted friends or family members, explain your condition, and let them know that you are working with a health care team.  · Consider giving educational materials to friends and  family.  · If you are having trouble telling your friends and family about your condition, keep in mind that honest and open communication can make these conversations easier.  Finances  Be sure to check with your insurance carrier to find out what treatment options are covered by your plan. You may also be able to find financial assistance through not-for-profit organizations or with local government-based resources.  If you are taking medicines, you may be able to get the generic form, which may be less expensive than brand-name medicine. Some makers of prescription medicines also offer help to patients who cannot afford the medicines that they need.  Therapy and support groups  · Make sure you find a counselor or therapist who is familiar with ScAD. Meet with your counselor or therapist once a week or more often if needed.  · Find support programs for people with ScAD, such as local groups associated with the [employer] [place] [employer]ness. You can begin your search here: www.agustín.org  Follow these instructions at home:  · Take over-the-counter and prescription medicines only as told by your health care provider. Do not start new medicines or stop taking medicines before you ask your health care provider if it is safe to make those changes.  · Avoid caffeine, alcohol, and drugs. They can affect how your medicine works and can make your symptoms worse.  · Eat a healthy diet.  · Keep all follow-up visits as told by your health care provider, therapist, or counselor. This is important.  · Look for support groups in your area so you can meet other people with your condition and learn new coping methods.  Contact a health care provider if:  · You are not able to take your medicines as prescribed.  · Your symptoms get worse.  Get help right away if:  · You have serious thoughts about hurting yourself or others.  If you ever feel like you may hurt yourself or others, or have thoughts about taking your own life, get  help right away. You can go to your nearest emergency department or call:  · Your local emergency services (911 in the [place]  · A suicide crisis helpline, such as the National Suicide Prevention Lifeline at 1-585.120.9686. This is open 24 hours a day.  Summary  · Schizoaffective disorder (ScAD) is a lifelong chronic illness. It is best controlled with continuous treatment that includes medicine and therapy.  · Learning ways to deal with stress may help your treatment work better.  · Having the support of your family and friends can be a key to making your treatment a success.  · If you find that your condition is getting worse, talk to your health care provider right away.  This information is not intended to replace advice given to you by your health care provider. Make sure you discuss any questions you have with your health care provider.  Document Released: 04/19/2018 Document Revised: 04/19/2018 Document Reviewed: 04/19/2018  [employer] Interactive Patient Education © 2019 [employer]

## 2019-09-30 ENCOUNTER — TRANSCRIBE ORDERS (OUTPATIENT)
Dept: ADMINISTRATIVE | Facility: HOSPITAL | Age: 29
End: 2019-09-30

## 2019-09-30 DIAGNOSIS — G60.9 NEUROPATHY, PERIPHERAL, IDIOPATHIC: ICD-10-CM

## 2019-09-30 DIAGNOSIS — E04.1 THYROID NODULE: Primary | ICD-10-CM

## 2019-10-04 ENCOUNTER — HOSPITAL ENCOUNTER (EMERGENCY)
Facility: HOSPITAL | Age: 29
Discharge: HOME | End: 2019-10-04
Admitting: EMERGENCY MEDICINE

## 2019-10-04 ENCOUNTER — APPOINTMENT (OUTPATIENT)
Dept: GENERAL RADIOLOGY | Facility: HOSPITAL | Age: 29
End: 2019-10-04

## 2019-10-04 VITALS
WEIGHT: 293 LBS | OXYGEN SATURATION: 96 % | HEIGHT: 70 IN | BODY MASS INDEX: 41.95 KG/M2 | TEMPERATURE: 98.4 F | RESPIRATION RATE: 16 BRPM | SYSTOLIC BLOOD PRESSURE: 106 MMHG | HEART RATE: 76 BPM | DIASTOLIC BLOOD PRESSURE: 71 MMHG

## 2019-10-04 DIAGNOSIS — R51.9 NONINTRACTABLE HEADACHE, UNSPECIFIED CHRONICITY PATTERN, UNSPECIFIED HEADACHE TYPE: Primary | ICD-10-CM

## 2019-10-04 DIAGNOSIS — R42 DIZZY: ICD-10-CM

## 2019-10-04 LAB
ANION GAP SERPL CALCULATED.3IONS-SCNC: 15.9 MMOL/L (ref 5–15)
BASOPHILS # BLD AUTO: 0.1 10*3/MM3 (ref 0–0.2)
BASOPHILS NFR BLD AUTO: 0.5 % (ref 0–1.5)
BUN BLD-MCNC: 9 MG/DL (ref 8–20)
BUN/CREAT SERPL: 10 (ref 5.4–26.2)
CALCIUM SPEC-SCNC: 8.6 MG/DL (ref 8.9–10.3)
CHLORIDE SERPL-SCNC: 106 MMOL/L (ref 101–111)
CO2 SERPL-SCNC: 23 MMOL/L (ref 22–32)
CREAT BLD-MCNC: 0.9 MG/DL (ref 0.4–1)
DEPRECATED RDW RBC AUTO: 46.4 FL (ref 37–54)
EOSINOPHIL # BLD AUTO: 0.2 10*3/MM3 (ref 0–0.4)
EOSINOPHIL NFR BLD AUTO: 2.5 % (ref 0.3–6.2)
ERYTHROCYTE [DISTWIDTH] IN BLOOD BY AUTOMATED COUNT: 14.6 % (ref 12.3–15.4)
GFR SERPL CREATININE-BSD FRML MDRD: 74 ML/MIN/1.73
GLUCOSE BLD-MCNC: 111 MG/DL (ref 65–99)
HCT VFR BLD AUTO: 42.2 % (ref 34–46.6)
HGB BLD-MCNC: 14.5 G/DL (ref 12–15.9)
LYMPHOCYTES # BLD AUTO: 2.5 10*3/MM3 (ref 0.7–3.1)
LYMPHOCYTES NFR BLD AUTO: 26.3 % (ref 19.6–45.3)
MCH RBC QN AUTO: 30.8 PG (ref 26.6–33)
MCHC RBC AUTO-ENTMCNC: 34.3 G/DL (ref 31.5–35.7)
MCV RBC AUTO: 89.7 FL (ref 79–97)
MONOCYTES # BLD AUTO: 0.6 10*3/MM3 (ref 0.1–0.9)
MONOCYTES NFR BLD AUTO: 6.2 % (ref 5–12)
NEUTROPHILS # BLD AUTO: 6.2 10*3/MM3 (ref 1.7–7)
NEUTROPHILS NFR BLD AUTO: 64.5 % (ref 42.7–76)
NRBC BLD AUTO-RTO: 0 /100 WBC (ref 0–0.2)
PLATELET # BLD AUTO: 231 10*3/MM3 (ref 140–450)
PMV BLD AUTO: 7.4 FL (ref 6–12)
POTASSIUM BLD-SCNC: 3.9 MMOL/L (ref 3.6–5.1)
RBC # BLD AUTO: 4.7 10*6/MM3 (ref 3.77–5.28)
SODIUM BLD-SCNC: 141 MMOL/L (ref 136–144)
WBC NRBC COR # BLD: 9.7 10*3/MM3 (ref 3.4–10.8)

## 2019-10-04 PROCEDURE — 80048 BASIC METABOLIC PNL TOTAL CA: CPT | Performed by: PHYSICIAN ASSISTANT

## 2019-10-04 PROCEDURE — 85025 COMPLETE CBC W/AUTO DIFF WBC: CPT | Performed by: PHYSICIAN ASSISTANT

## 2019-10-04 PROCEDURE — 99284 EMERGENCY DEPT VISIT MOD MDM: CPT

## 2019-10-04 PROCEDURE — 72040 X-RAY EXAM NECK SPINE 2-3 VW: CPT

## 2019-10-04 RX ORDER — METHOCARBAMOL 500 MG/1
500 TABLET, FILM COATED ORAL ONCE
Status: COMPLETED | OUTPATIENT
Start: 2019-10-04 | End: 2019-10-04

## 2019-10-04 RX ORDER — IBUPROFEN 400 MG/1
800 TABLET ORAL ONCE
Status: COMPLETED | OUTPATIENT
Start: 2019-10-04 | End: 2019-10-04

## 2019-10-04 RX ORDER — METHOCARBAMOL 750 MG/1
750 TABLET, FILM COATED ORAL 3 TIMES DAILY
Qty: 12 TABLET | Refills: 0 | Status: SHIPPED | OUTPATIENT
Start: 2019-10-04 | End: 2020-04-14

## 2019-10-04 RX ADMIN — METHOCARBAMOL TABLETS 500 MG: 500 TABLET, COATED ORAL at 15:04

## 2019-10-04 RX ADMIN — IBUPROFEN 800 MG: 400 TABLET ORAL at 15:04

## 2019-10-04 NOTE — ED PROVIDER NOTES
"Subjective   Patient is a 29-year-old  female with history of depression, anxiety, and bipolar disorder who reports to the ER complaining of feeling confused.  Patient states that she has felt confused and \"dazed\" intermittently for the past 3 years.  Patient states that she felt more confused starting yesterday at 9:30 AM until today.  Patient describes this as \"I feel like I lose track of time\".  Patient also complains of headaches and dizziness but states that she \"always has headaches and that (she is) always dizzy\".  Patient denies any changes in her headaches or dizziness.  Patient reports that she has hallucinations but states these are also \"normal\" for her and that she takes medication for this.  She denies any changes or increased frequency in these hallucinations.  Patient denies suicidal ideation or homicidal ideation.  Patient states that she was here about a week and a half ago and had an MRI done.  Patient states that she is scheduled for various testing in November as well as appointment to see neurologist in December.  Patient also complains of some neck pain for 3 days.  She states that she woke up with it one day and feels that her neck is stiff.  She denies trauma or injury.  Patient denies any chest pain, shortness of breath or fever.  Patient takes Seroquel, trazodone, gabapentin and Klonopin and reports no recent changes in these medications.        History provided by:  Patient      Review of Systems   Constitutional: Negative for fever.   HENT: Negative for sore throat and trouble swallowing.    Eyes: Negative for visual disturbance.   Respiratory: Negative for shortness of breath and wheezing.    Cardiovascular: Negative for chest pain.   Gastrointestinal: Negative for abdominal pain, diarrhea, nausea and vomiting.   Genitourinary: Negative for dysuria.   Musculoskeletal: Positive for neck pain. Negative for neck stiffness.        Tenderness of posterior neck   Skin: Negative for " rash.   Neurological: Positive for dizziness and headaches. Negative for syncope and light-headedness.   Psychiatric/Behavioral: Positive for confusion and hallucinations. Negative for behavioral problems, self-injury and suicidal ideas.        Reports hallucinations is normal for her, no changes or increase in frequency of these   All other systems reviewed and are negative.      Past Medical History:   Diagnosis Date   • Anxiety    • Bipolar disorder (CMS/HCC)    • Depression    • DJD (degenerative joint disease)    • Panic disorder     [facility]   • Thyroid nodule        Allergies   Allergen Reactions   • Adhesive Tape Hives   • Latex Swelling       Past Surgical History:   Procedure Laterality Date   • ABDOMINAL SURGERY     •  SECTION      x 2   • CHOLECYSTECTOMY     • GASTRECTOMY         Family History   Problem Relation Age of Onset   • Diabetes Other    • Hypertension Other    • Cancer Other         Colon, Breast, Uterine Cancer       Social History     Socioeconomic History   • Marital status:      Spouse name: Not on file   • Number of children: Not on file   • Years of education: Not on file   • Highest education level: Not on file   Tobacco Use   • Smoking status: Current Every Day Smoker     Packs/day: 1.00     Types: Cigarettes   Substance and Sexual Activity   • Alcohol use: Yes   • Drug use: No   • Sexual activity: Not Currently           Objective   Physical Exam   Constitutional: She is oriented to person, place, and time. She appears well-developed and well-nourished. No distress.   HENT:   Head: Normocephalic and atraumatic.   Mouth/Throat: No oropharyngeal exudate.   Eyes: EOM are normal. Pupils are equal, round, and reactive to light.   Neck: Normal range of motion. Neck supple.   Cardiovascular: Normal rate, regular rhythm, normal heart sounds and intact distal pulses.   Pulmonary/Chest: Effort normal and breath sounds normal.   Abdominal: Soft.  "  Musculoskeletal: Normal range of motion. She exhibits tenderness.   Tenderness cervical paraspinal muscles  Cervical spine: No midline tenderness to palpation. No step-offs or deformities. Pain-free range of motion.   Neurological: She is alert and oriented to person, place, and time. No sensory deficit.   Murali-Hallpike negative   Skin: Skin is warm and dry. Capillary refill takes less than 2 seconds. No rash noted.   Psychiatric: She has a normal mood and affect. Her speech is normal and behavior is normal. Judgment and thought content normal. Cognition and memory are normal.   Vitals reviewed.      Procedures           ED Course    /71 (BP Location: Left arm, Patient Position: Sitting)   Pulse 76   Temp 98.4 °F (36.9 °C) (Oral)   Resp 16   Ht 177.8 cm (70\")   Wt 135 kg (297 lb 2.9 oz)   LMP 09/17/2019 (Within Days)   SpO2 96%   Breastfeeding? No   BMI 42.64 kg/m²   Labs Reviewed   BASIC METABOLIC PANEL - Abnormal; Notable for the following components:       Result Value    Glucose 111 (*)     Calcium 8.6 (*)     Anion Gap 15.9 (*)     All other components within normal limits   CBC WITH AUTO DIFFERENTIAL - Normal   CBC AND DIFFERENTIAL    Narrative:     The following orders were created for panel order CBC & Differential.  Procedure                               Abnormality         Status                     ---------                               -----------         ------                     CBC Auto Differential[[id number]]        Normal              Final result                 Please view results for these tests on the individual orders.     Medications   methocarbamol (ROBAXIN) tablet 500 mg (500 mg Oral Given 10/4/19 1504)   ibuprofen (ADVIL,MOTRIN) tablet 800 mg (800 mg Oral Given 10/4/19 1504)     Xr Spine Cervical 2 Or 3 View    Result Date: 10/4/2019  Straightening of the normal cervical lordosis.  No acute bony abnormality or significant degenerative change of the cervical spine.  " Electronically Signed By-Chandler Quan On:10/4/2019 3:50 PM This report was finalized on [id number] by  Chandler Quan, .                  MDM  Number of Diagnoses or Management Options  Dizzy:   Nonintractable headache, unspecified chronicity pattern, unspecified headache type:   Diagnosis management comments: MEDICAL DECISION  Comorbidities: Bipolar, history of hallucinations  Differentials: Psychiatric disorder, migraine, ; this list is not all inclusive and does not constitute the entirety of considered causes  Radiology interpretation:  Images reviewed by me and interpreted by radiologist,   XR cervical spine  Final result by Chandler Quan MD (10/04/19 15:50:56)  Impression:     Straightening of the normal cervical lordosis.  No acute bony  abnormality or significant degenerative change of the cervical spine.     Electronically Signed By-Chandler Quan On:10/4/2019 3:50 PM  This report was finalized on [id number] by  Chandler Quan, .  Lab interpretation:  Labs viewed by me significant for, BMP glucose 111, calcium 8.6.  CBC essentially normal.    Patient was seen and evaluated by myself here in the emergency room.  Patient was complaining of feeling confused since yesterday morning at 930 AM.  Patient is complaining of headache and some dizziness but states that this is not different from her everyday headache and dizziness.  Patient was given ibuprofen and Robaxin while in the emergency room and she reported some pain relief.  Patient lab work showed no acute abnormalities.  Patient's x-ray of cervical spine showed no acute osseous of normalities.  Patient had MRI of brain done 9/24/2019 which showed:  FINDINGS:    A routine nonenhanced brain MRI exam was performed. No acute brain  abnormality is seen. No acute infarct. No acute intracranial hemorrhage.  No midline shift or acute intracranial mass effect is seen. The  ventricular size and configuration are within normal limits. Normal flow  voids are  seen within the basal cerebral arteries. Mild chronic sinus  disease is suggested. There may be minimal congestive and/or  inflammatory changes of the bilateral mastoid air cell complexes, which  is age-indeterminate.     IMPRESSION:  No acute brain abnormality is seen.    Patient states that she is currently taking trazodone, Klonopin, Seroquel and gabapentin and that she sees per primary care provider at [facility], but she wasn't able to get in to see them today.  Patient denies having suicidal or homicidal ideations.  Due to patient having recent MRI scan that was WNL, and patient's symptoms are not worse or different from her headache, dizziness or confusion for the past couple months, CT head was not felt to be warranted.  Patient confirmed that she has headaches, dizziness and hallucinations every day and that she takes medications for this.  She denies thunderclap onset or worst headache of her life.  Recommended patient to follow-up with her primary care providers at [facility] for medication recheck.  Patient was encouraged to keep her appointments with her neurologist in December, but was also given contact information for another neurosurgeon if she wanted to try to get in earlier.  Discharge plan instructions were discussed with the patient who verbalized understanding.  Patient understands the signs symptoms that would require immediate return to the ER, including worsening headache, thunderclap onset headache, worsening dizziness, shortness of breath, chest pain, blurry vision or fever.  Patient was discharged in improved stable condition with upright steady gait.       Amount and/or Complexity of Data Reviewed  Clinical lab tests: reviewed  Tests in the radiology section of CPT®: reviewed        Final diagnoses:   Nonintractable headache, unspecified chronicity pattern, unspecified headache type   Zoila Dyer PA  10/04/19 2046

## 2019-10-04 NOTE — DISCHARGE INSTRUCTIONS
May take Robaxin as needed for muscle tightness and spasms in neck.  May also apply heat to the area.  Do not drink alcohol while taking this medication.  May also take ibuprofen or Tylenol as needed for pain.    Follow-up with primary care provider at [facility] for further evaluation, worsening concerns and management of current antipsychotic medications.  May call neurologist Dr. Estevez to schedule an appointment for further evaluation of dizziness and headaches.    May return to ER for new or worsening symptoms, increased headache, dizziness, chest pain, shortness of breath, injectable nausea or vomiting or fever.

## 2019-11-02 DIAGNOSIS — F31.5 BIPOLAR I DISORDER, MOST RECENT EPISODE (OR CURRENT) DEPRESSED, SEVERE, SPECIFIED AS WITH PSYCHOTIC BEHAVIOR (HCC): ICD-10-CM

## 2019-11-02 RX ORDER — GABAPENTIN 300 MG/1
CAPSULE ORAL
Qty: 90 CAPSULE | Refills: 2 | Status: SHIPPED | OUTPATIENT
Start: 2019-11-02 | End: 2019-11-25 | Stop reason: SDUPTHER

## 2019-11-15 ENCOUNTER — HOSPITAL ENCOUNTER (OUTPATIENT)
Dept: ULTRASOUND IMAGING | Facility: HOSPITAL | Age: 29
Discharge: HOME | End: 2019-11-15
Admitting: NURSE PRACTITIONER

## 2019-11-15 ENCOUNTER — APPOINTMENT (OUTPATIENT)
Dept: NEUROLOGY | Facility: HOSPITAL | Age: 29
End: 2019-11-15

## 2019-11-15 ENCOUNTER — HOSPITAL ENCOUNTER (OUTPATIENT)
Dept: NEUROLOGY | Facility: HOSPITAL | Age: 29
Discharge: HOME | End: 2019-11-15

## 2019-11-15 DIAGNOSIS — E04.1 THYROID NODULE: ICD-10-CM

## 2019-11-15 DIAGNOSIS — G60.9 NEUROPATHY, PERIPHERAL, IDIOPATHIC: ICD-10-CM

## 2019-11-15 PROCEDURE — 76536 US EXAM OF HEAD AND NECK: CPT

## 2019-11-19 DIAGNOSIS — F51.05 INSOMNIA DUE TO OTHER MENTAL DISORDER: ICD-10-CM

## 2019-11-19 DIAGNOSIS — F99 INSOMNIA DUE TO OTHER MENTAL DISORDER: ICD-10-CM

## 2019-11-19 RX ORDER — TRAZODONE HYDROCHLORIDE 50 MG/1
50 TABLET ORAL NIGHTLY
Qty: 30 TABLET | Refills: 0 | Status: SHIPPED | OUTPATIENT
Start: 2019-11-19 | End: 2019-12-27

## 2019-11-25 DIAGNOSIS — F31.5 BIPOLAR I DISORDER, MOST RECENT EPISODE (OR CURRENT) DEPRESSED, SEVERE, SPECIFIED AS WITH PSYCHOTIC BEHAVIOR (HCC): ICD-10-CM

## 2019-11-26 RX ORDER — GABAPENTIN 300 MG/1
CAPSULE ORAL
Qty: 90 CAPSULE | Refills: 2 | Status: SHIPPED | OUTPATIENT
Start: 2019-11-26 | End: 2020-01-07

## 2019-12-04 ENCOUNTER — TELEPHONE (OUTPATIENT)
Dept: PSYCHIATRY | Facility: CLINIC | Age: 29
End: 2019-12-04

## 2019-12-04 NOTE — TELEPHONE ENCOUNTER
Patient called and advised the SEROquel is not helping and her hallucinations are increasing again. Was checking to see if you wanted to switch her medication or wait til her appt on 01/08/2020

## 2019-12-10 RX ORDER — RISPERIDONE 1 MG/1
1 TABLET ORAL 2 TIMES DAILY
Qty: 60 TABLET | Refills: 2 | Status: SHIPPED | OUTPATIENT
Start: 2019-12-10 | End: 2020-01-08

## 2019-12-10 NOTE — TELEPHONE ENCOUNTER
rx for risperidone was sent to the pharmacy   Decrease seroquel to 300 mg (only 1 tabs )  po QHS for 2 days and then d.c     Try risperidone , if it makes her feel groggy  After AM dose, take both tabs at night

## 2019-12-13 ENCOUNTER — HOSPITAL ENCOUNTER (OUTPATIENT)
Dept: NEUROLOGY | Facility: HOSPITAL | Age: 29
Discharge: HOME | End: 2019-12-13
Admitting: NURSE PRACTITIONER

## 2019-12-13 PROCEDURE — 95910 NRV CNDJ TEST 7-8 STUDIES: CPT | Performed by: PSYCHIATRY & NEUROLOGY

## 2019-12-13 PROCEDURE — 95886 MUSC TEST DONE W/N TEST COMP: CPT

## 2019-12-13 PROCEDURE — 95886 MUSC TEST DONE W/N TEST COMP: CPT | Performed by: PSYCHIATRY & NEUROLOGY

## 2019-12-13 PROCEDURE — 95910 NRV CNDJ TEST 7-8 STUDIES: CPT

## 2019-12-13 NOTE — PROCEDURES
EMG REPORT    Indication: Numbness and tingling of all 4 extremities, symmetric    Findings: Right median, ulnar, and radial sensory study showed normal latencies and amplitudes.  Right median motor study showed normal distal latency velocity and amplitude.  Right ulnar motor study showed normal distal latency and amplitude.  Right peroneal motor study showed normal distal latency velocity and amplitude.  Right tibial motor study showed normal distal latency amplitude and F-wave latency.    Concentric needle EMG of the right deltoid, triceps, brachioradialis, extensor digitorum, and first dorsal interosseous muscles showed no abnormality.  Concentric needle EMG of the right vastus lateralis, vastus medialis, anterior tibialis, peroneus, gastrocnemius muscles showed no abnormality.    Impression: Normal EMG and nerve conduction studies of the right upper and lower extremities.       Juan Manuel Harper M.D.

## 2019-12-18 DIAGNOSIS — F43.12 CHRONIC POST-TRAUMATIC STRESS DISORDER (PTSD): ICD-10-CM

## 2019-12-20 RX ORDER — CLONAZEPAM 1 MG/1
TABLET ORAL
Qty: 60 TABLET | Refills: 0 | Status: SHIPPED | OUTPATIENT
Start: 2019-12-20 | End: 2020-01-08

## 2019-12-26 ENCOUNTER — TELEPHONE (OUTPATIENT)
Dept: PSYCHIATRY | Facility: CLINIC | Age: 29
End: 2019-12-26

## 2019-12-26 NOTE — TELEPHONE ENCOUNTER
Patient said the risperdal started on 12/4/2019 isn't working. She doesn't know if she been on it long enough to see or feel improvement or if she needs to try something else please advise

## 2019-12-27 ENCOUNTER — TELEPHONE (OUTPATIENT)
Dept: PSYCHIATRY | Facility: CLINIC | Age: 29
End: 2019-12-27

## 2019-12-27 DIAGNOSIS — F51.05 INSOMNIA DUE TO OTHER MENTAL DISORDER: ICD-10-CM

## 2019-12-27 DIAGNOSIS — F99 INSOMNIA DUE TO OTHER MENTAL DISORDER: ICD-10-CM

## 2019-12-27 RX ORDER — TRAZODONE HYDROCHLORIDE 50 MG/1
TABLET ORAL
Qty: 30 TABLET | Refills: 0 | Status: SHIPPED | OUTPATIENT
Start: 2019-12-27 | End: 2020-01-08

## 2020-01-03 ENCOUNTER — TELEPHONE (OUTPATIENT)
Dept: PSYCHIATRY | Facility: CLINIC | Age: 30
End: 2020-01-03

## 2020-01-03 NOTE — TELEPHONE ENCOUNTER
PHARMACY IS REQUESTING EARLY FILL ON GABAPENTIN, STATES SHE IS GOING ON CRUISE Monday. PATIENT GOT PARTIAL FILL ON LAST SCRIPT AND ISNT DUE TILL THE  1/12

## 2020-01-06 NOTE — TELEPHONE ENCOUNTER
APPROVED EARLY FILL AT PHARMACY FOR GABAPENTIN AND STATED THAT PATIENT SHOULD ALREADY HAVE SCRIPT ON FILE

## 2020-01-07 DIAGNOSIS — F31.5 BIPOLAR I DISORDER, MOST RECENT EPISODE (OR CURRENT) DEPRESSED, SEVERE, SPECIFIED AS WITH PSYCHOTIC BEHAVIOR (HCC): ICD-10-CM

## 2020-01-07 RX ORDER — GABAPENTIN 300 MG/1
CAPSULE ORAL
Qty: 90 CAPSULE | Refills: 0 | Status: SHIPPED | OUTPATIENT
Start: 2020-01-07 | End: 2020-01-08 | Stop reason: SDUPTHER

## 2020-01-08 ENCOUNTER — OFFICE VISIT (OUTPATIENT)
Dept: PSYCHIATRY | Facility: CLINIC | Age: 30
End: 2020-01-08

## 2020-01-08 DIAGNOSIS — F41.1 GENERALIZED ANXIETY DISORDER: ICD-10-CM

## 2020-01-08 DIAGNOSIS — F48.2 PSEUDOBULBAR AFFECT: Primary | ICD-10-CM

## 2020-01-08 DIAGNOSIS — F43.12 CHRONIC POST-TRAUMATIC STRESS DISORDER (PTSD): ICD-10-CM

## 2020-01-08 DIAGNOSIS — F25.0 SCHIZOAFFECTIVE DISORDER, BIPOLAR TYPE (HCC): ICD-10-CM

## 2020-01-08 DIAGNOSIS — F99 INSOMNIA DUE TO OTHER MENTAL DISORDER: ICD-10-CM

## 2020-01-08 DIAGNOSIS — F51.05 INSOMNIA DUE TO OTHER MENTAL DISORDER: ICD-10-CM

## 2020-01-08 PROCEDURE — 99214 OFFICE O/P EST MOD 30 MIN: CPT | Performed by: PSYCHIATRY & NEUROLOGY

## 2020-01-08 RX ORDER — GABAPENTIN 300 MG/1
600 CAPSULE ORAL 3 TIMES DAILY
Qty: 180 CAPSULE | Refills: 2 | Status: SHIPPED | OUTPATIENT
Start: 2020-01-08 | End: 2020-03-27

## 2020-01-08 RX ORDER — BUSPIRONE HYDROCHLORIDE 10 MG/1
10 TABLET ORAL 2 TIMES DAILY
Qty: 60 TABLET | Refills: 2 | Status: SHIPPED | OUTPATIENT
Start: 2020-01-08 | End: 2020-03-27

## 2020-01-08 RX ORDER — QUETIAPINE FUMARATE 300 MG/1
600 TABLET, FILM COATED ORAL NIGHTLY
Qty: 60 TABLET | Refills: 2 | Status: SHIPPED | OUTPATIENT
Start: 2020-01-08 | End: 2020-03-27
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
## 2020-01-08 NOTE — PATIENT INSTRUCTIONS
Generalized Anxiety Disorder, Adult  Generalized anxiety disorder (PAUL) is a mental health disorder. People with this condition constantly worry about everyday events. Unlike normal anxiety, worry related to PAUL is not triggered by a specific event. These worries also do not fade or get better with time. PAUL interferes with life functions, including relationships, work, and school.  PAUL can vary from mild to severe. People with severe PAUL can have intense waves of anxiety with physical symptoms (panic attacks).  What are the causes?  The exact cause of PAUL is not known.  What increases the risk?  This condition is more likely to develop in:  · Women.  · People who have a family history of anxiety disorders.  · People who are very shy.  · People who experience very stressful life events, such as the death of a loved one.  · People who have a very stressful family environment.  What are the signs or symptoms?  People with PAUL often worry excessively about many things in their lives, such as their health and family. They may also be overly concerned about:  · Doing well at work.  · Being on time.  · Natural disasters.  · Friendships.  Physical symptoms of PAUL include:  · Fatigue.  · Muscle tension or having muscle twitches.  · Trembling or feeling shaky.  · Being easily startled.  · Feeling like your heart is pounding or racing.  · Feeling out of breath or like you cannot take a deep breath.  · Having trouble falling asleep or staying asleep.  · Sweating.  · Nausea, diarrhea, or irritable bowel syndrome (IBS).  · Headaches.  · Trouble concentrating or remembering facts.  · Restlessness.  · Irritability.  How is this diagnosed?  Your health care provider can diagnose PAUL based on your symptoms and medical history. You will also have a physical exam. The health care provider will ask specific questions about your symptoms, including how severe they are, when they started, and if they come and go. Your health care  provider may ask you about your use of alcohol or drugs, including prescription medicines. Your health care provider may refer you to a mental health specialist for further evaluation.  Your health care provider will do a thorough examination and may perform additional tests to rule out other possible causes of your symptoms.  To be diagnosed with PAUL, a person must have anxiety that:  · Is out of his or her control.  · Affects several different aspects of his or her life, such as work and relationships.  · Causes distress that makes him or her unable to take part in normal activities.  · Includes at least three physical symptoms of PAUL, such as restlessness, fatigue, trouble concentrating, irritability, muscle tension, or sleep problems.  Before your health care provider can confirm a diagnosis of PAUL, these symptoms must be present more days than they are not, and they must last for six months or longer.  How is this treated?  The following therapies are usually used to treat PAUL:  · Medicine. Antidepressant medicine is usually prescribed for long-term daily control. Antianxiety medicines may be added in severe cases, especially when panic attacks occur.  · Talk therapy (psychotherapy). Certain types of talk therapy can be helpful in treating PAUL by providing support, education, and guidance. Options include:  ? Cognitive behavioral therapy (CBT). People learn coping skills and techniques to ease their anxiety. They learn to identify unrealistic or negative thoughts and behaviors and to replace them with positive ones.  ? Acceptance and commitment therapy (ACT). This treatment teaches people how to be mindful as a way to cope with unwanted thoughts and feelings.  ? Biofeedback. This process trains you to manage your body's response (physiological response) through breathing techniques and relaxation methods. You will work with a therapist while machines are used to monitor your physical symptoms.  · Stress  management techniques. These include yoga, meditation, and exercise.  A mental health specialist can help determine which treatment is best for you. Some people see improvement with one type of therapy. However, other people require a combination of therapies.  Follow these instructions at home:  · Take over-the-counter and prescription medicines only as told by your health care provider.  · Try to maintain a normal routine.  · Try to anticipate stressful situations and allow extra time to manage them.  · Practice any stress management or self-calming techniques as taught by your health care provider.  · Do not punish yourself for setbacks or for not making progress.  · Try to recognize your accomplishments, even if they are small.  · Keep all follow-up visits as told by your health care provider. This is important.  Contact a health care provider if:  · Your symptoms do not get better.  · Your symptoms get worse.  · You have signs of depression, such as:  ? A persistently sad, cranky, or irritable mood.  ? Loss of enjoyment in activities that used to bring you pita.  ? Change in weight or eating.  ? Changes in sleeping habits.  ? Avoiding friends or family members.  ? Loss of energy for normal tasks.  ? Feelings of guilt or worthlessness.  Get help right away if:  · You have serious thoughts about hurting yourself or others.  If you ever feel like you may hurt yourself or others, or have thoughts about taking your own life, get help right away. You can go to your nearest emergency department or call:  · Your local emergency services (911 in the [place]  · A suicide crisis helpline, such as the National Suicide Prevention Lifeline at 1-508.791.5338. This is open 24 hours a day.  Summary  · Generalized anxiety disorder (PAUL) is a mental health disorder that involves worry that is not triggered by a specific event.  · People with PAUL often worry excessively about many things in their lives, such as their health and  family.  · PAUL may cause physical symptoms such as restlessness, trouble concentrating, sleep problems, frequent sweating, nausea, diarrhea, headaches, and trembling or muscle twitching.  · A mental health specialist can help determine which treatment is best for you. Some people see improvement with one type of therapy. However, other people require a combination of therapies.  This information is not intended to replace advice given to you by your health care provider. Make sure you discuss any questions you have with your health care provider.  Document Released: 04/14/2014 Document Revised: 11/07/2017 Document Reviewed: 11/07/2017  [employer] Interactive Patient Education © 2019 [employer]

## 2020-03-27 DIAGNOSIS — F41.1 GENERALIZED ANXIETY DISORDER: ICD-10-CM

## 2020-03-27 DIAGNOSIS — F25.0 SCHIZOAFFECTIVE DISORDER, BIPOLAR TYPE (HCC): ICD-10-CM

## 2020-03-27 RX ORDER — BUSPIRONE HYDROCHLORIDE 10 MG/1
TABLET ORAL
Qty: 60 TABLET | Refills: 2 | Status: SHIPPED | OUTPATIENT
Start: 2020-03-27 | End: 2020-04-10

## 2020-03-27 RX ORDER — QUETIAPINE FUMARATE 300 MG/1
TABLET, FILM COATED ORAL
Qty: 60 TABLET | Refills: 2 | Status: SHIPPED | OUTPATIENT
Start: 2020-03-27 | End: 2020-04-10 | Stop reason: DRUGHIGH

## 2020-03-27 RX ORDER — GABAPENTIN 300 MG/1
CAPSULE ORAL
Qty: 180 CAPSULE | Refills: 2 | Status: SHIPPED | OUTPATIENT
Start: 2020-03-27 | End: 2020-05-26

## 2020-04-10 ENCOUNTER — OFFICE VISIT (OUTPATIENT)
Dept: PSYCHIATRY | Facility: CLINIC | Age: 30
End: 2020-04-10

## 2020-04-10 DIAGNOSIS — F31.60 BIPOLAR AFFECTIVE DISORDER, MIXED (HCC): Primary | ICD-10-CM

## 2020-04-10 DIAGNOSIS — F43.12 CHRONIC POST-TRAUMATIC STRESS DISORDER (PTSD): ICD-10-CM

## 2020-04-10 DIAGNOSIS — F51.01 PRIMARY INSOMNIA: ICD-10-CM

## 2020-04-10 PROCEDURE — 99213 OFFICE O/P EST LOW 20 MIN: CPT | Performed by: PSYCHIATRY & NEUROLOGY

## 2020-04-10 RX ORDER — HYDROXYZINE HYDROCHLORIDE 10 MG/1
10 TABLET, FILM COATED ORAL 3 TIMES DAILY PRN
Qty: 90 TABLET | Refills: 2 | Status: SHIPPED | OUTPATIENT
Start: 2020-04-10 | End: 2020-07-09 | Stop reason: SDUPTHER

## 2020-04-10 RX ORDER — HALOPERIDOL 2 MG/1
2 TABLET ORAL 2 TIMES DAILY
Qty: 60 TABLET | Refills: 2 | Status: SHIPPED | OUTPATIENT
Start: 2020-04-10 | End: 2020-05-06 | Stop reason: SDUPTHER

## 2020-04-10 RX ORDER — QUETIAPINE FUMARATE 400 MG/1
400 TABLET, FILM COATED ORAL NIGHTLY
Qty: 30 TABLET | Refills: 2 | Status: SHIPPED | OUTPATIENT
Start: 2020-04-10 | End: 2020-07-09 | Stop reason: SDUPTHER

## 2020-04-10 NOTE — PATIENT INSTRUCTIONS
Bipolar 1 Disorder  Bipolar 1 disorder is a mental health disorder in which a person has episodes of emotional highs (kimberly), and may also have episodes of emotional lows (depression) in addition to highs. Bipolar 1 disorder is different from other bipolar disorders because it involves extreme manic episodes. These episodes last at least one week or involve symptoms that are so severe that hospitalization is needed to keep the person safe.  What increases the risk?  The cause of this condition is not known. However, certain factors make you more likely to have bipolar disorder, such as:  · Having a family member with the disorder.  · An imbalance of certain chemicals in the brain (neurotransmitters).  · Stress, such as illness, financial problems, or a death.  · Certain conditions that affect the brain or spinal cord (neurologic conditions).  · Brain injury (trauma).  · Having another mental health disorder, such as:  ? Obsessive compulsive disorder.  ? Schizophrenia.  What are the signs or symptoms?  Symptoms of kimberly include:  · Very high self-esteem or self-confidence.  · Decreased need for sleep.  · Unusual talkativeness or feeling a need to keep talking. Speech may be very fast. It may seem like you cannot stop talking.  · Racing thoughts or constant talking, with quick shifts between topics that may or may not be related (flight of ideas).  · Decreased ability to focus or concentrate.  · Increased purposeful activity, such as work, studies, or social activity.  · Increased nonproductive activity. This could be pacing, squirming and fidgeting, or finger and toe tapping.  · Impulsive behavior and poor judgment. This may result in high-risk activities, such as having unprotected sex or spending a lot of money.  Symptoms of depression include:  · Feeling sad, hopeless, or helpless.  · Frequent or uncontrollable crying.  · Lack of feeling or caring about anything.  · Sleeping too much.  · Moving more slowly than  usual.  · Not being able to enjoy things you used to enjoy.  · Wanting to be alone all the time.  · Feeling guilty or worthless.  · Lack of energy or motivation.  · Trouble concentrating or remembering.  · Trouble making decisions.  · Increased appetite.  · Thoughts of death, or the desire to harm yourself.  Sometimes, you may have a mixed mood. This means having symptoms of depression and kimberly. Stress can make symptoms worse.  How is this diagnosed?  To diagnose bipolar disorder, your health care provider may ask about your:  · Emotional episodes.  · Medical history.  · Alcohol and drug use. This includes prescription medicines. Certain medical conditions and substances can cause symptoms that seem like bipolar disorder (secondary bipolar disorder).  How is this treated?  Bipolar disorder is a long-term (chronic) illness. It is best controlled with ongoing (continuous) treatment rather than treatment only when symptoms occur. Treatment may include:  · Medicine. Medicine can be prescribed by a provider who specializes in treating mental disorders (psychiatrist).  ? Medicines called mood stabilizers are usually prescribed.  ? If symptoms occur even while taking a mood stabilizer, other medicines may be added.  · Psychotherapy. Some forms of talk therapy, such as cognitive-behavioral therapy (CBT), can provide support, education, and guidance.  · Coping methods, such as journaling or relaxation exercises. These may include:  ? Yoga.  ? Meditation.  ? Deep breathing.  · Lifestyle changes, such as:  ? Limiting alcohol and drug use.  ? Exercising regularly.  ? Getting plenty of sleep.  ? Making healthy eating choices.    A combination of medicine, talk therapy, and coping methods is best. A procedure in which electricity is applied to the brain through the scalp (electroconvulsive therapy) may be used in cases of severe kimberly when medicine and psychotherapy work too slowly or do not work.  Follow these instructions at  home:  Activity    · Return to your normal activities as told by your health care provider.  · Find activities that you enjoy, and make time to do them.  · Exercise regularly as told by your health care provider.  Lifestyle  · Limit alcohol intake to no more than 1 drink a day for nonpregnant women and 2 drinks a day for men. One drink equals 12 oz of beer, 5 oz of wine, or 1½ oz of hard liquor.  · Follow a set schedule for eating and sleeping.  · Eat a balanced diet that includes fresh fruits and vegetables, whole grains, low-fat dairy, and lean meat.  · Get 7-8 hours of sleep each night.  General instructions  · Take over-the-counter and prescription medicines only as told by your health care provider.  · Think about joining a support group. Your health care provider may be able to recommend a support group.  · Talk with your family and loved ones about your treatment goals and how they can help.  · Keep all follow-up visits as told by your health care provider. This is important.  Where to find more information  For more information about bipolar disorder, visit the following websites:  · National [place] on Mental Illness: www.agustín.org  · [place] National [place] of [place] Health: www.nimh.nih.gov  Contact a health care provider if:  · Your symptoms get worse.  · You have side effects from your medicine, and they get worse.  · You have trouble sleeping.  · You have trouble doing daily activities.  · You feel unsafe in your surroundings.  · You are dealing with substance abuse.  Get help right away if:  · You have new symptoms.  · You have thoughts about harming yourself.  · You self-harm.  This information is not intended to replace advice given to you by your health care provider. Make sure you discuss any questions you have with your health care provider.  Document Released: 03/26/2002 Document Revised: 08/13/2017 Document Reviewed: 08/17/2017  [employer] Interactive Patient Education © 2020 [employer]
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
## 2020-04-15 ENCOUNTER — TELEPHONE (OUTPATIENT)
Dept: URGENT CARE | Facility: CLINIC | Age: 30
End: 2020-04-15

## 2020-05-05 ENCOUNTER — TELEPHONE (OUTPATIENT)
Dept: PSYCHIATRY | Facility: CLINIC | Age: 30
End: 2020-05-05

## 2020-05-05 NOTE — TELEPHONE ENCOUNTER
5/5/20- [facility] [place] [facility]-- [phone]--- CALLED AND  FAXED -- GABAPENTIN IS CONTROLLED IN [place] REQUEST NEW RX -- FOR GABAPENTIN 300MG  TAKE 2 CAPSULES BY MOUTH 3 TIMES A DAY #180--PLEASE ESCRIBE  CONTROLLED  MEDS TO THIS NEW PHARMACY FOR PT--/LKS

## 2020-05-06 RX ORDER — HALOPERIDOL 2 MG/1
2 TABLET ORAL 2 TIMES DAILY
Qty: 60 TABLET | Refills: 2 | Status: SHIPPED | OUTPATIENT
Start: 2020-05-06 | End: 2020-07-09 | Stop reason: SINTOL

## 2020-05-06 NOTE — TELEPHONE ENCOUNTER
Attempted to contact the pt 2ry to SI warning in the chart (the pt endorsed SI , message was left on her voice mail to call the office and give updates about  her condition  Before I send refills

## 2020-05-06 NOTE — TELEPHONE ENCOUNTER
Problem: Patient Care Overview  Goal: Plan of Care Review  Outcome: Ongoing (interventions implemented as appropriate)  Flowsheets (Taken 3/6/2020 1128)  Plan of Care Reviewed With: patient;sibling  Outcome Summary: PT PRESENTS LIKELY CLOSE TO BASELINE WITH FUNCTIONAL MOBILITY BUT WILL BENEFIT FROM INPT P.T. TO ASSURE PLOF AT D/C TO AIDE CAREGIVERS. PT CONFUSED AT BASELINE BUT AMBULATES SHORT DISTANCES WITH ASSIST OF CAREGIVERS.  SOME DISCUSSION RE: HOME WITH FAMILY CAREGIVERS 24/7 OR LTC PLACEMENT. PT AMBULATED 10 FEET WITH MIN ASSIST OF 2 AND R WALKER.       5/6/20--FYI----- CALLED PATIENT-- SHE STATED SHE DID NOT ASK FOR HER GABAPENTIN  TO BE SWITCHED FROM [facility] TO [facility]-- MARTHA AND I TOLD HER TO CALL THE [facility] IN [place] AND TELL THEM SHE DID NOT ASK FOR HER MEDS TO BE CHANGED THERE( MARTHA ALSO SPOKE TO [facility] ABOUT THE SITUATION)-- FYI SHE USES [facility] ONLY!!!--PLEASE LET ME KNOW IF THERE IS ANYTHING ELSE WE NEED TO DO--- PT DID NOT SOUND SUICIDAL/LKS-FYI

## 2020-05-06 NOTE — TELEPHONE ENCOUNTER
The pt was contacted at [phone] on 5/6/2020 at 15:00  2ry to + suicide screening .  The pt reported feeling stable, no urges to harm herself, compliant with meds, tolerates well, sees therapist 2 times per week

## 2020-05-06 NOTE — TELEPHONE ENCOUNTER
5/6/20-- FAXED REQUEST FORM [facility]-- HALOPERIDOL 2MG ONE BID #60--- ( I CALLED PATIENT AND SHE DID ORDER THE MEDICATION REFILL)  PLEASE REFILL MEDICATION/LKS

## 2020-05-21 ENCOUNTER — TELEPHONE (OUTPATIENT)
Dept: PSYCHIATRY | Facility: CLINIC | Age: 30
End: 2020-05-21

## 2020-05-21 NOTE — TELEPHONE ENCOUNTER
5/21/20-- PT CALLED IS EXTREMELY TIRED ALL THE TIME-- HAS 2 CHILDEREN AND GETS TIRED OFF MEDICATION-- HAS TRIED IT FOR A WHILE-- DOES NOT KNOW WHICH MEDS  IT IS-- ATARAX OR HALDOL??-- WHAT TO DO--- ATARAX TAKING IT 3 TIMES A DAY EVERY DAY  AND HALDOL TWICE A DAY--PLEASE ADVISE WHAT TO DO-- PT CALL BACK #[phone]--- USES [facility]---/ASHU

## 2020-05-26 RX ORDER — GABAPENTIN 300 MG/1
CAPSULE ORAL
Qty: 90 CAPSULE | Refills: 0 | Status: SHIPPED | OUTPATIENT
Start: 2020-05-26 | End: 2020-06-18

## 2020-06-18 RX ORDER — GABAPENTIN 300 MG/1
CAPSULE ORAL
Qty: 90 CAPSULE | Refills: 0 | Status: SHIPPED | OUTPATIENT
Start: 2020-06-18 | End: 2020-07-08

## 2020-07-08 RX ORDER — GABAPENTIN 300 MG/1
CAPSULE ORAL
Qty: 90 CAPSULE | Refills: 0 | Status: SHIPPED | OUTPATIENT
Start: 2020-07-08 | End: 2020-07-09 | Stop reason: SDUPTHER

## 2020-07-09 ENCOUNTER — OFFICE VISIT (OUTPATIENT)
Dept: PSYCHIATRY | Facility: CLINIC | Age: 30
End: 2020-07-09

## 2020-07-09 DIAGNOSIS — F51.04 PSYCHOPHYSIOLOGICAL INSOMNIA: ICD-10-CM

## 2020-07-09 DIAGNOSIS — F48.2 PSEUDOBULBAR AFFECT: ICD-10-CM

## 2020-07-09 DIAGNOSIS — F43.12 CHRONIC POST-TRAUMATIC STRESS DISORDER (PTSD): ICD-10-CM

## 2020-07-09 DIAGNOSIS — F31.60 BIPOLAR AFFECTIVE DISORDER, MIXED (HCC): Primary | ICD-10-CM

## 2020-07-09 PROCEDURE — 99442 PR PHYS/QHP TELEPHONE EVALUATION 11-20 MIN: CPT | Performed by: PSYCHIATRY & NEUROLOGY

## 2020-07-09 RX ORDER — RISPERIDONE 0.5 MG/1
0.5 TABLET ORAL
Qty: 30 TABLET | Refills: 2 | Status: SHIPPED | OUTPATIENT
Start: 2020-07-09 | End: 2020-10-20 | Stop reason: SDUPTHER

## 2020-07-09 RX ORDER — GABAPENTIN 300 MG/1
600 CAPSULE ORAL 3 TIMES DAILY
Qty: 180 CAPSULE | Refills: 2 | Status: SHIPPED | OUTPATIENT
Start: 2020-07-09 | End: 2020-09-28 | Stop reason: SDUPTHER

## 2020-07-09 RX ORDER — QUETIAPINE FUMARATE 400 MG/1
400 TABLET, FILM COATED ORAL NIGHTLY
Qty: 30 TABLET | Refills: 2 | Status: SHIPPED | OUTPATIENT
Start: 2020-07-09 | End: 2020-10-20 | Stop reason: SDUPTHER

## 2020-07-09 RX ORDER — HYDROXYZINE HYDROCHLORIDE 25 MG/1
25 TABLET, FILM COATED ORAL 3 TIMES DAILY PRN
Qty: 90 TABLET | Refills: 2 | Status: SHIPPED | OUTPATIENT
Start: 2020-07-09 | End: 2020-10-22 | Stop reason: SDUPTHER

## 2020-07-09 NOTE — PATIENT INSTRUCTIONS
Bipolar 1 Disorder  Bipolar 1 disorder is a mental health disorder in which a person has episodes of emotional highs (kimberly), and may also have episodes of emotional lows (depression) in addition to highs. Bipolar 1 disorder is different from other bipolar disorders because it involves extreme manic episodes. These episodes last at least one week or involve symptoms that are so severe that hospitalization is needed to keep the person safe.  What increases the risk?  The cause of this condition is not known. However, certain factors make you more likely to have bipolar disorder, such as:  · Having a family member with the disorder.  · An imbalance of certain chemicals in the brain (neurotransmitters).  · Stress, such as illness, financial problems, or a death.  · Certain conditions that affect the brain or spinal cord (neurologic conditions).  · Brain injury (trauma).  · Having another mental health disorder, such as:  ? Obsessive compulsive disorder.  ? Schizophrenia.  What are the signs or symptoms?  Symptoms of kimberly include:  · Very high self-esteem or self-confidence.  · Decreased need for sleep.  · Unusual talkativeness or feeling a need to keep talking. Speech may be very fast. It may seem like you cannot stop talking.  · Racing thoughts or constant talking, with quick shifts between topics that may or may not be related (flight of ideas).  · Decreased ability to focus or concentrate.  · Increased purposeful activity, such as work, studies, or social activity.  · Increased nonproductive activity. This could be pacing, squirming and fidgeting, or finger and toe tapping.  · Impulsive behavior and poor judgment. This may result in high-risk activities, such as having unprotected sex or spending a lot of money.  Symptoms of depression include:  · Feeling sad, hopeless, or helpless.  · Frequent or uncontrollable crying.  · Lack of feeling or caring about anything.  · Sleeping too much.  · Moving more slowly than  usual.  · Not being able to enjoy things you used to enjoy.  · Wanting to be alone all the time.  · Feeling guilty or worthless.  · Lack of energy or motivation.  · Trouble concentrating or remembering.  · Trouble making decisions.  · Increased appetite.  · Thoughts of death, or the desire to harm yourself.  Sometimes, you may have a mixed mood. This means having symptoms of depression and kimberly. Stress can make symptoms worse.  How is this diagnosed?  To diagnose bipolar disorder, your health care provider may ask about your:  · Emotional episodes.  · Medical history.  · Alcohol and drug use. This includes prescription medicines. Certain medical conditions and substances can cause symptoms that seem like bipolar disorder (secondary bipolar disorder).  How is this treated?  Bipolar disorder is a long-term (chronic) illness. It is best controlled with ongoing (continuous) treatment rather than treatment only when symptoms occur. Treatment may include:  · Medicine. Medicine can be prescribed by a provider who specializes in treating mental disorders (psychiatrist).  ? Medicines called mood stabilizers are usually prescribed.  ? If symptoms occur even while taking a mood stabilizer, other medicines may be added.  · Psychotherapy. Some forms of talk therapy, such as cognitive-behavioral therapy (CBT), can provide support, education, and guidance.  · Coping methods, such as journaling or relaxation exercises. These may include:  ? Yoga.  ? Meditation.  ? Deep breathing.  · Lifestyle changes, such as:  ? Limiting alcohol and drug use.  ? Exercising regularly.  ? Getting plenty of sleep.  ? Making healthy eating choices.    A combination of medicine, talk therapy, and coping methods is best. A procedure in which electricity is applied to the brain through the scalp (electroconvulsive therapy) may be used in cases of severe kimberly when medicine and psychotherapy work too slowly or do not work.  Follow these instructions at  home:  Activity    · Return to your normal activities as told by your health care provider.  · Find activities that you enjoy, and make time to do them.  · Exercise regularly as told by your health care provider.  Lifestyle  · Limit alcohol intake to no more than 1 drink a day for nonpregnant women and 2 drinks a day for men. One drink equals 12 oz of beer, 5 oz of wine, or 1½ oz of hard liquor.  · Follow a set schedule for eating and sleeping.  · Eat a balanced diet that includes fresh fruits and vegetables, whole grains, low-fat dairy, and lean meat.  · Get 7-8 hours of sleep each night.  General instructions  · Take over-the-counter and prescription medicines only as told by your health care provider.  · Think about joining a support group. Your health care provider may be able to recommend a support group.  · Talk with your family and loved ones about your treatment goals and how they can help.  · Keep all follow-up visits as told by your health care provider. This is important.  Where to find more information  For more information about bipolar disorder, visit the following websites:  · National [employer] on Mental Illness: www.agustín.org  · [place] National [place] of Mental Health: www.nimh.nih.gov  Contact a health care provider if:  · Your symptoms get worse.  · You have side effects from your medicine, and they get worse.  · You have trouble sleeping.  · You have trouble doing daily activities.  · You feel unsafe in your surroundings.  · You are dealing with substance abuse.  Get help right away if:  · You have new symptoms.  · You have thoughts about harming yourself.  · You self-harm.  This information is not intended to replace advice given to you by your health care provider. Make sure you discuss any questions you have with your health care provider.  Document Released: 03/26/2002 Document Revised: 11/30/2018 Document Reviewed: 08/17/2017  Elsevier Patient Education © 2020 [employer]
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
## 2020-07-17 RX ORDER — HYDROXYZINE HYDROCHLORIDE 10 MG/1
TABLET, FILM COATED ORAL
Qty: 135 TABLET | Refills: 0 | Status: SHIPPED | OUTPATIENT
Start: 2020-07-17 | End: 2020-10-20 | Stop reason: DRUGHIGH

## 2020-07-17 RX ORDER — GABAPENTIN 300 MG/1
CAPSULE ORAL
Qty: 540 CAPSULE | Refills: 0 | OUTPATIENT
Start: 2020-07-17

## 2020-07-17 RX ORDER — HALOPERIDOL 2 MG/1
TABLET ORAL
Qty: 60 TABLET | Refills: 1 | OUTPATIENT
Start: 2020-07-17

## 2020-09-18 ENCOUNTER — TELEPHONE (OUTPATIENT)
Dept: PSYCHIATRY | Facility: CLINIC | Age: 30
End: 2020-09-18

## 2020-09-18 NOTE — TELEPHONE ENCOUNTER
PATIENT WAS IN [facility] AND THEY ADDED THE MEDICATIONS LEXIPRO 10 MG ONCE IN MORNING AND CARDURA 1 MG ONCE NIGHTLY. SHE WANTED TO KNOW IF YOU WILL TAKE THOSE OVER AND PRESCRIBE HER?

## 2020-09-20 NOTE — TELEPHONE ENCOUNTER
Yes, she has appt on Oct 20, will she need refill before then? If yes, ask pharmacy to send refill request when it is due

## 2020-09-30 RX ORDER — GABAPENTIN 300 MG/1
600 CAPSULE ORAL 3 TIMES DAILY
Qty: 180 CAPSULE | Refills: 2 | Status: SHIPPED | OUTPATIENT
Start: 2020-09-30 | End: 2020-10-20

## 2020-10-20 ENCOUNTER — OFFICE VISIT (OUTPATIENT)
Dept: PSYCHIATRY | Facility: CLINIC | Age: 30
End: 2020-10-20

## 2020-10-20 DIAGNOSIS — F43.12 CHRONIC POST-TRAUMATIC STRESS DISORDER (PTSD): ICD-10-CM

## 2020-10-20 DIAGNOSIS — F48.2 PSEUDOBULBAR AFFECT: Primary | ICD-10-CM

## 2020-10-20 DIAGNOSIS — F31.60 BIPOLAR AFFECTIVE DISORDER, MIXED (HCC): ICD-10-CM

## 2020-10-20 PROCEDURE — 99214 OFFICE O/P EST MOD 30 MIN: CPT | Performed by: PSYCHIATRY & NEUROLOGY

## 2020-10-20 RX ORDER — ESCITALOPRAM OXALATE 20 MG/1
20 TABLET ORAL DAILY
Qty: 30 TABLET | Refills: 2 | Status: SHIPPED | OUTPATIENT
Start: 2020-10-20 | End: 2020-10-20 | Stop reason: SDUPTHER

## 2020-10-20 RX ORDER — ESCITALOPRAM OXALATE 20 MG/1
20 TABLET ORAL DAILY
Qty: 30 TABLET | Refills: 2 | Status: SHIPPED | OUTPATIENT
Start: 2020-10-20 | End: 2020-11-13 | Stop reason: SDUPTHER

## 2020-10-20 RX ORDER — QUETIAPINE FUMARATE 400 MG/1
400 TABLET, FILM COATED ORAL NIGHTLY
Qty: 30 TABLET | Refills: 2 | Status: SHIPPED | OUTPATIENT
Start: 2020-10-20 | End: 2021-01-21

## 2020-10-20 RX ORDER — RISPERIDONE 0.5 MG/1
0.5 TABLET ORAL
Qty: 30 TABLET | Refills: 2 | Status: SHIPPED | OUTPATIENT
Start: 2020-10-20 | End: 2020-11-13 | Stop reason: SINTOL

## 2020-10-20 RX ORDER — DOXAZOSIN MESYLATE 1 MG/1
1 TABLET ORAL NIGHTLY
Qty: 30 TABLET | Refills: 1 | Status: SHIPPED | OUTPATIENT
Start: 2020-10-20 | End: 2020-10-20 | Stop reason: SDUPTHER

## 2020-10-20 RX ORDER — DOXAZOSIN MESYLATE 1 MG/1
1 TABLET ORAL NIGHTLY
Qty: 30 TABLET | Refills: 1 | Status: SHIPPED | OUTPATIENT
Start: 2020-10-20 | End: 2020-10-30 | Stop reason: SINTOL

## 2020-10-20 NOTE — PATIENT INSTRUCTIONS
Bipolar 2 Disorder  Bipolar 2 disorder is a mental health disorder in which a person has episodes of emotional highs and episodes of emotional lows, or depression. In bipolar 2 disorder, the episodes of emotional highs are less extreme and do not last as long as in bipolar 1 disorder. These highs are called hypomania.  People with bipolar 2 disorder have had at least one episode of hypomania (hypomanic episode) in their lives, which is usually followed by a depressive episode. Some people may have cycles of hypomanic and depressive episodes. Some people with bipolar 2 disorder may lead a very normal life between episodes.  What are the causes?  The cause of this condition is not known.  What increases the risk?  The following factors may make you more likely to develop this condition:  · Having a family member with the disorder.  · Having an imbalance of certain chemicals in the brain (neurotransmitters).  · Experiencing stress, such as illness, divorce, financial problems, or a death.  · Having certain conditions that affect the brain or spinal cord (neurologic conditions).  · Having had a brain injury (trauma).  What are the signs or symptoms?  Symptoms of hypomania include:  · Very high self-esteem or self-confidence.  · Decreased need for sleep.  · Unusual talkativeness. Speech may be very fast.  · Racing thoughts, with quick shifts between topics that may or may not be related (flight of ideas).  · Change in ability to concentrate. Some people may have better focus, and others may not be able to focus at all.  · Increased agitation. This could be pacing, squirming, fidgeting, or finger and toe tapping.  · Impulsive behavior and poor judgment. This may result in high-risk activities, such as:  ? Being sexual with people you normally wouldn't be sexual with.  ? Spending money you have borrowed on things you don't need.  Symptoms of depression include:  · Extreme degrees of sadness, uncontrollable crying,  hopelessness, worthlessness, or numbness.  · Sleep problems, such as insomnia, waking early, or sleeping too much.  · No longer enjoying things you used to enjoy.  · Isolation. You may often spend time alone.  · Lack of energy or moving more slowly than normal.  · Trouble making decisions.  · Changes in appetite, such as eating too much or not eating.  · Thoughts of death, or wanting to harm yourself.  Sometimes, you may have a mix of symptoms of hypomania and depression at the same time. Stress can often trigger these symptoms.  How is this diagnosed?  This condition may be diagnosed based on:  · Emotional episodes.  · Medical history.  · Use of alcohol, drugs, and prescription medicines. Certain medical conditions and substances can cause symptoms that seem like bipolar disorder. This is called secondary bipolar disorder.  Your health care provider may ask you to take a short test. This helps to understand your symptoms. You may also be asked to see a mental health specialist for further evaluation or to start treatment.  How is this treated?         This condition is a long-term (chronic) illness. It is often managed with ongoing treatment rather than treatment only when symptoms occur. A combination of treatments is the main approach. Treatment may include:  · Psychotherapy. Some forms of talk therapy, such as cognitive behavioral therapy (CBT) and family therapy, can help with learning to manage bipolar disorder.  · Psychoeducation. This helps you and others understand how this disorder is managed. Include friends and family in educational sessions so they learn how best to support you.  · Methods of managing your condition, such as journaling or relaxation exercises. Relaxation exercises include:  ? Yoga.  ? Meditation.  ? Deep breathing.  · Lifestyle changes, such as:  ? Limiting alcohol and drug use.  ? Exercising regularly.  ? Structuring when you go to bed and when you get up.  ? Eating a healthy  diet.  · Medicine. Medicine can be prescribed by a health care provider who specializes in treating mental health disorders (psychiatrist). Medicines called mood stabilizers are usually prescribed. If symptoms occur during treatment with a mood stabilizer, other medicines may be added.  Follow these instructions at home:  Activity  · Return to your normal activities as told by your health care provider.  · Find activities that you enjoy, and make time to do them.  · Exercise regularly as told by your health care provider.  Lifestyle    · Follow a set daily schedule.  · Eat a healthy diet that includes fresh fruits and vegetables, whole grains, low-fat dairy, and lean meat.  · Get at least 7-8 hours of sleep each night.  · Avoid using products that contain nicotine or tobacco. If you want help quitting, ask your health care provider.  · Do not use drugs.  Alcohol use  · Do not drink alcohol if:  ? Your health care provider tells you not to drink.  ? You are pregnant, may be pregnant, or are planning to become pregnant.  · If you drink alcohol:  ? Limit how much you use to:  § 0-1 drink a day for women.  § 0-2 drinks a day for men.  ? Be aware of how much alcohol is in your drink. In the [place] one drink equals one 12 oz bottle of beer (355 mL), one 5 oz glass of wine (148 mL), or one 1½ oz glass of hard liquor (44 mL).  General instructions  · Take over-the-counter and prescription medicines only as told by your health care provider. You may think about stopping your medicine, but it is very important to take your medicine as prescribed.  · Consider joining a support group. Your health care provider may be able to recommend one.  · Talk with your family and friends about your treatment goals and how they can help.  · Keep all follow-up visits as told by your health care provider. This is important.  Where to find more information  · National [place] on Mental Illness: www.[place].org  · National [place] of Mental  Health: www.[facility].gov  Contact a health care provider if:  · Your symptoms get worse, or your loved ones tell you that your symptoms are getting worse.  · You have uncomfortable side effects from your medicine.  · You have trouble sleeping.  · You have trouble doing daily activities.  · You feel unsafe in your surroundings.  · You are self-medicating with alcohol or drugs.  Get help right away if:  · You have new symptoms.  · You have thoughts about harming yourself or others.  · You are considering suicide.  If you ever feel like you may hurt yourself or others, or have thoughts about taking your own life, get help right away. You can go to your nearest emergency department or call:  · Your local emergency services (911 in the [place]  · A suicide crisis helpline, such as the National Suicide Prevention Lifeline at 1-833.152.2470. This is open 24 hours a day.  Summary  · Bipolar 2 disorder is a lifelong mental health disorder in which a person has episodes of hypomania and depression.  · This disorder is mainly treated with a combination of talk therapy, education, strategies for managing the condition, and medicines.  · Talk with your family and friends about your treatment goals and how they can help.  · Get help right away if you are considering suicide.  This information is not intended to replace advice given to you by your health care provider. Make sure you discuss any questions you have with your health care provider.  Document Released: 01/23/2018 Document Revised: 06/02/2020 Document Reviewed: 06/02/2020  [employer]vier Patient Education © 2020 [employer]
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
## 2020-10-22 RX ORDER — HYDROXYZINE HYDROCHLORIDE 25 MG/1
25 TABLET, FILM COATED ORAL 3 TIMES DAILY PRN
Qty: 90 TABLET | Refills: 2 | Status: SHIPPED | OUTPATIENT
Start: 2020-10-22 | End: 2020-11-13 | Stop reason: SDUPTHER

## 2020-10-26 ENCOUNTER — TELEPHONE (OUTPATIENT)
Dept: PSYCHIATRY | Facility: CLINIC | Age: 30
End: 2020-10-26

## 2020-10-26 NOTE — TELEPHONE ENCOUNTER
TRINA THINKS THE DOXAZOSIN IS CAUSING HER BLOOD PRESSURE TO BE LOW. SHE STATED SHE SKIPPED A DOSE AND FELT BETTER. SHE DID NOT KNOW WHAT HER bP READING WAS AT THE TIME IT WAS LOW- SHE STATED HER MOTHER TOLD HER IT WAS TOO LOW. PLEASE ADVISE

## 2020-10-30 RX ORDER — PRAZOSIN HYDROCHLORIDE 1 MG/1
1 CAPSULE ORAL NIGHTLY
Qty: 30 CAPSULE | Refills: 0 | Status: SHIPPED | OUTPATIENT
Start: 2020-10-30 | End: 2020-11-27

## 2020-11-13 ENCOUNTER — OFFICE VISIT (OUTPATIENT)
Dept: PSYCHIATRY | Facility: CLINIC | Age: 30
End: 2020-11-13

## 2020-11-13 DIAGNOSIS — F43.12 CHRONIC POST-TRAUMATIC STRESS DISORDER (PTSD): ICD-10-CM

## 2020-11-13 DIAGNOSIS — F51.05 INSOMNIA DUE TO OTHER MENTAL DISORDER: ICD-10-CM

## 2020-11-13 DIAGNOSIS — F31.60 BIPOLAR AFFECTIVE DISORDER, MIXED (HCC): Primary | ICD-10-CM

## 2020-11-13 DIAGNOSIS — F48.2 PSEUDOBULBAR AFFECT: ICD-10-CM

## 2020-11-13 DIAGNOSIS — F99 INSOMNIA DUE TO OTHER MENTAL DISORDER: ICD-10-CM

## 2020-11-13 PROCEDURE — 99442 PR PHYS/QHP TELEPHONE EVALUATION 11-20 MIN: CPT | Performed by: PSYCHIATRY & NEUROLOGY

## 2020-11-13 RX ORDER — ARIPIPRAZOLE 5 MG/1
5 TABLET ORAL DAILY
Qty: 30 TABLET | Refills: 0 | Status: SHIPPED | OUTPATIENT
Start: 2020-11-13 | End: 2020-12-11

## 2020-11-13 RX ORDER — ESCITALOPRAM OXALATE 20 MG/1
20 TABLET ORAL DAILY
Qty: 30 TABLET | Refills: 2 | Status: SHIPPED | OUTPATIENT
Start: 2020-11-13 | End: 2021-02-02

## 2020-11-13 RX ORDER — HYDROXYZINE HYDROCHLORIDE 25 MG/1
25 TABLET, FILM COATED ORAL 3 TIMES DAILY PRN
Qty: 90 TABLET | Refills: 2 | Status: SHIPPED | OUTPATIENT
Start: 2020-11-13 | End: 2021-02-24
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
## 2020-11-13 NOTE — PATIENT INSTRUCTIONS
Bipolar 1 Disorder  Bipolar 1 disorder is a mental health disorder in which a person has episodes of emotional highs, or kimberly, and may also have episodes of lows, or depression. Bipolar 1 disorder is different from other bipolar disorders in that it involves extreme episodes of kimberly (manic episodes). These episodes last at least one week or involve symptoms that are so severe that hospitalization is needed to keep the person safe.  What are the causes?  The cause of this condition is not known.  What increases the risk?  The following factors may make you more likely to develop this condition:  · Having a family member with the disorder.  · Having an imbalance of certain chemicals in the brain (neurotransmitters).  · Experiencing stress, such as illness, financial problems, or a death.  · Having certain conditions that affect the brain or spinal cord (neurologic conditions).  · Having had a brain injury (trauma).  What are the signs or symptoms?  Symptoms of kimberly include:  · Very high self-esteem or self-confidence.  · Decreased need for sleep.  · Unusual talkativeness. Speech may be very fast.  · Racing thoughts with quick shifts between topics that may or may not be related (flight of ideas).  · Ability to concentrate either greatly improved or decreased.  · Increased purposeful activity, such as work, studies, or social activity.  · Increased agitation. This could be pacing, squirming, fidgeting, or finger and toe tapping.  · Impulsive behavior and poor judgment. This may result in high-risk activities that are sexual, financial, or physical.  Symptoms of depression include:  · Extreme degrees of sadness, uncontrollable crying, hopelessness, worthlessness, or numbness.  · Sleep problems, such as insomnia, waking early, or sleeping too much.  · No longer enjoying things you used to enjoy.  · Isolation. You may often spend time alone.  · Lack of energy or motivation, and moving more slowly than  normal.  · Trouble making decisions.  · Increased appetite or loss of appetite.  · Thoughts of death, or wanting to harm yourself.  Sometimes, you may have a mixed mood. This means having symptoms of kimberly and depression at the same time. Stress can often trigger these symptoms.  How is this diagnosed?  This condition may be diagnosed based on:  · Emotional episodes.  · Medical history.  · Use of alcohol, drugs, and prescription medicines. Certain medical conditions and substances can cause symptoms that seem like bipolar disorder. This is called secondary bipolar disorder.  Your health care provider may ask you to take a short test. This helps to understand your symptoms. You may also be asked to see a mental health specialist to follow up on this diagnosis and start treatment.  How is this treated?         This condition is a long-term (chronic) illness. It is often managed with ongoing treatment rather than treatment only when symptoms occur. A combination of treatments is the main approach. Treatment may include:  · Medicine. Medicine can be prescribed by a health care provider who specializes in treating mental health disorders (psychiatrist). Medicines called mood stabilizers are usually prescribed. If symptoms occur during treatment with a mood stabilizer, other medicines may be added.  · Psychotherapy. Some forms of talk therapy, such as cognitive behavioral therapy (CBT) and family therapy, can help with learning to manage bipolar disorder.  · Psychoeducation. This helps you and others understand how this disorder is managed. Include friends and family in educational sessions so they learn how best to support you.  · Methods of managing your condition, such as journaling or relaxation exercises. Relaxation exercises include:  ? Yoga.  ? Meditation.  ? Deep breathing.  · Lifestyle changes, such as:  ? Limiting alcohol and drug use.  ? Exercising regularly.  ? Structuring when you go to bed and get  up.  ? Eating a healthy diet.  · Electroconvulsive therapy (ECT). This is a procedure in which electricity is applied to the brain through the scalp. It may be used in cases of severe bipolar disorder when medicine and psychotherapy work too slowly or do not work.  Follow these instructions at home:  Activity  · Return to your normal activities as told by your health care provider.  · Find activities that you enjoy, and make time to do them.  · Exercise regularly as told by your health care provider.  Lifestyle    · Follow a set schedule for eating and sleeping.  · Eat a healthy diet that includes fresh fruits and vegetables, whole grains, low-fat dairy, and lean meat.  · Get at least 7-8 hours of sleep each night.  · Avoid using products that contain nicotine or tobacco. If you want help quitting, ask your health care provider.  · Do not use drugs.  Alcohol use  · Do not drink alcohol if:  ? Your health care provider tells you not to drink.  ? You are pregnant, may be pregnant, or are planning to become pregnant.  · If you drink alcohol:  ? Limit how much you use to:  § 0-1 drink a day for women.  § 0-2 drinks a day for men.  ? Be aware of how much alcohol is in your drink. In the [place] one drink equals one 12 oz bottle of beer (355 mL), one 5 oz glass of wine (148 mL), or one 1½ oz glass of hard liquor (44 mL).  General instructions  · Take over-the-counter and prescription medicines only as told by your health care provider. You may think about stopping your medicine, but it is very important to take all your medicine as prescribed.  · Consider joining a support group. Your health care provider may be able to recommend one.  · Talk with your family and friends about your treatment goals and how they can help.  · Keep all follow-up visits as told by your health care provider. This is important.  Where to find more information  · National [place] on Mental Illness: www.[place]ust[place].org  · National [place] of Mental  Health: www.[facility].gov  Contact a health care provider if:  · Your symptoms get worse, or your loved ones tell you that your symptoms are getting worse.  · You have uncomfortable side effects from your medicine.  · You have trouble sleeping.  · You have trouble doing daily activities.  · You feel unsafe in your surroundings.  · You are self-medicating with alcohol or drugs.  Get help right away if:  · You have new symptoms.  · You have thoughts about harming yourself or others.  · You are considering suicide.  If you ever feel like you may hurt yourself or others, or have thoughts about taking your own life, get help right away. You can go to your nearest emergency department or call:  · Your local emergency services (911 in the [place]  · A suicide crisis helpline, such as the National Suicide Prevention Lifeline at 1-478.461.1825. This is open 24 hours a day.  Summary  · Bipolar 1 disorder is a lifelong mental health disorder in which a person has episodes of kimberly and depression.  · This disorder is mainly treated with a combination of medicines, talk and behavioral therapies, and, often, electroconvulsive therapy (ECT).  · Include friends and family in educational sessions so they know how best to support you.  · Get help right away if you are considering suicide.  This information is not intended to replace advice given to you by your health care provider. Make sure you discuss any questions you have with your health care provider.  Document Released: 03/26/2002 Document Revised: 06/02/2020 Document Reviewed: 06/02/2020  Elsevier Patient Education © 2020 [employer]

## 2020-11-27 RX ORDER — PRAZOSIN HYDROCHLORIDE 1 MG/1
CAPSULE ORAL
Qty: 30 CAPSULE | Refills: 0 | Status: SHIPPED | OUTPATIENT
Start: 2020-11-27 | End: 2021-01-05

## 2020-12-07 ENCOUNTER — TELEPHONE (OUTPATIENT)
Dept: PSYCHIATRY | Facility: CLINIC | Age: 30
End: 2020-12-07

## 2020-12-07 DIAGNOSIS — F41.1 GENERALIZED ANXIETY DISORDER: Primary | ICD-10-CM

## 2020-12-07 NOTE — TELEPHONE ENCOUNTER
Wants to know if she can go back on Clonazepam for her anxiety.She states she is taking hydroxyzine and it is  not helping.She stated that the clonazepam helped in the past.Please advise

## 2020-12-10 RX ORDER — CLONAZEPAM 0.5 MG/1
0.5 TABLET ORAL 2 TIMES DAILY PRN
Qty: 60 TABLET | Refills: 1 | Status: SHIPPED | OUTPATIENT
Start: 2020-12-10 | End: 2021-02-24

## 2020-12-11 DIAGNOSIS — F31.60 BIPOLAR AFFECTIVE DISORDER, MIXED (HCC): ICD-10-CM

## 2020-12-11 RX ORDER — ARIPIPRAZOLE 5 MG/1
TABLET ORAL
Qty: 30 TABLET | Refills: 2 | Status: SHIPPED | OUTPATIENT
Start: 2020-12-11 | End: 2021-02-24

## 2020-12-17 PROCEDURE — U0003 INFECTIOUS AGENT DETECTION BY NUCLEIC ACID (DNA OR RNA); SEVERE ACUTE RESPIRATORY SYNDROME CORONAVIRUS 2 (SARS-COV-2) (CORONAVIRUS DISEASE [COVID-19]), AMPLIFIED PROBE TECHNIQUE, MAKING USE OF HIGH THROUGHPUT TECHNOLOGIES AS DESCRIBED BY CMS-2020-01-R: HCPCS | Performed by: FAMILY MEDICINE

## 2020-12-21 ENCOUNTER — TELEPHONE (OUTPATIENT)
Dept: URGENT CARE | Facility: CLINIC | Age: 30
End: 2020-12-21

## 2021-01-05 RX ORDER — PRAZOSIN HYDROCHLORIDE 1 MG/1
CAPSULE ORAL
Qty: 30 CAPSULE | Refills: 1 | Status: SHIPPED | OUTPATIENT
Start: 2021-01-05 | End: 2021-02-24

## 2021-01-21 DIAGNOSIS — F31.60 BIPOLAR AFFECTIVE DISORDER, MIXED (HCC): ICD-10-CM

## 2021-01-21 RX ORDER — QUETIAPINE FUMARATE 400 MG/1
TABLET, FILM COATED ORAL
Qty: 30 TABLET | Refills: 2 | Status: SHIPPED | OUTPATIENT
Start: 2021-01-21 | End: 2021-04-22 | Stop reason: SDUPTHER

## 2021-02-01 DIAGNOSIS — F43.12 CHRONIC POST-TRAUMATIC STRESS DISORDER (PTSD): ICD-10-CM

## 2021-02-02 RX ORDER — ESCITALOPRAM OXALATE 20 MG/1
TABLET ORAL
Qty: 30 TABLET | Refills: 0 | Status: SHIPPED | OUTPATIENT
Start: 2021-02-02 | End: 2021-03-03

## 2021-02-22 DIAGNOSIS — F41.1 GENERALIZED ANXIETY DISORDER: ICD-10-CM

## 2021-02-24 DIAGNOSIS — F31.60 BIPOLAR AFFECTIVE DISORDER, MIXED (HCC): ICD-10-CM

## 2021-02-24 DIAGNOSIS — Z79.899 ENCOUNTER FOR LONG-TERM (CURRENT) USE OF OTHER MEDICATIONS: Primary | ICD-10-CM

## 2021-02-24 RX ORDER — PRAZOSIN HYDROCHLORIDE 1 MG/1
CAPSULE ORAL
Qty: 30 CAPSULE | Refills: 1 | Status: SHIPPED | OUTPATIENT
Start: 2021-02-24 | End: 2021-04-22

## 2021-02-24 RX ORDER — ARIPIPRAZOLE 5 MG/1
TABLET ORAL
Qty: 30 TABLET | Refills: 2 | Status: SHIPPED | OUTPATIENT
Start: 2021-02-24 | End: 2021-04-22

## 2021-02-24 RX ORDER — HYDROXYZINE HYDROCHLORIDE 25 MG/1
TABLET, FILM COATED ORAL
Qty: 90 TABLET | Refills: 2 | Status: SHIPPED | OUTPATIENT
Start: 2021-02-24 | End: 2021-05-21

## 2021-02-24 RX ORDER — CLONAZEPAM 0.5 MG/1
TABLET ORAL
Qty: 60 TABLET | Refills: 0 | Status: SHIPPED | OUTPATIENT
Start: 2021-02-24 | End: 2021-04-22 | Stop reason: SDUPTHER

## 2021-03-03 DIAGNOSIS — F43.12 CHRONIC POST-TRAUMATIC STRESS DISORDER (PTSD): ICD-10-CM

## 2021-03-03 RX ORDER — ESCITALOPRAM OXALATE 20 MG/1
TABLET ORAL
Qty: 30 TABLET | Refills: 0 | Status: SHIPPED | OUTPATIENT
Start: 2021-03-03 | End: 2021-03-30

## 2021-03-26 ENCOUNTER — TELEPHONE (OUTPATIENT)
Dept: PSYCHIATRY | Facility: CLINIC | Age: 31
End: 2021-03-26

## 2021-03-26 DIAGNOSIS — F43.12 CHRONIC POST-TRAUMATIC STRESS DISORDER (PTSD): ICD-10-CM

## 2021-03-26 RX ORDER — HALOPERIDOL 1 MG/1
1 TABLET ORAL 2 TIMES DAILY
Qty: 60 TABLET | Refills: 0 | Status: SHIPPED | OUTPATIENT
Start: 2021-03-26 | End: 2021-04-22 | Stop reason: SDUPTHER

## 2021-03-26 NOTE — TELEPHONE ENCOUNTER
She has tried in the past  and it caused  her to fall asleep, She states she can try it again since she is on different medications now.

## 2021-03-26 NOTE — TELEPHONE ENCOUNTER
Patti is reporting Hallucinations that are bad- would not go into details. She said you are aware of her issues. On Seroquel and Abilify which are not helping . Please advise

## 2021-03-30 RX ORDER — ESCITALOPRAM OXALATE 20 MG/1
TABLET ORAL
Qty: 30 TABLET | Refills: 0 | Status: SHIPPED | OUTPATIENT
Start: 2021-03-30 | End: 2021-04-22 | Stop reason: SDUPTHER

## 2021-04-23 RX ORDER — PRAZOSIN HYDROCHLORIDE 1 MG/1
CAPSULE ORAL
Qty: 30 CAPSULE | Refills: 1 | Status: SHIPPED | OUTPATIENT
Start: 2021-04-23 | End: 2021-06-24

## 2021-05-07 ENCOUNTER — TELEPHONE (OUTPATIENT)
Dept: PSYCHIATRY | Facility: CLINIC | Age: 31
End: 2021-05-07

## 2021-05-19 RX ORDER — HALOPERIDOL 1 MG/1
1 TABLET ORAL 2 TIMES DAILY
Qty: 60 TABLET | Refills: 3 | Status: SHIPPED | OUTPATIENT
Start: 2021-05-19 | End: 2021-05-25 | Stop reason: SDUPTHER

## 2021-05-20 DIAGNOSIS — F31.60 BIPOLAR AFFECTIVE DISORDER, MIXED (HCC): ICD-10-CM

## 2021-05-21 RX ORDER — ARIPIPRAZOLE 5 MG/1
TABLET ORAL
Qty: 30 TABLET | Refills: 2 | Status: SHIPPED | OUTPATIENT
Start: 2021-05-21 | End: 2021-06-24

## 2021-05-21 RX ORDER — HYDROXYZINE HYDROCHLORIDE 25 MG/1
TABLET, FILM COATED ORAL
Qty: 90 TABLET | Refills: 2 | Status: SHIPPED | OUTPATIENT
Start: 2021-05-21 | End: 2021-05-27 | Stop reason: SDUPTHER

## 2021-05-24 ENCOUNTER — TELEPHONE (OUTPATIENT)
Dept: PSYCHIATRY | Facility: CLINIC | Age: 31
End: 2021-05-24

## 2021-05-25 RX ORDER — HALOPERIDOL 2 MG/1
2 TABLET ORAL 2 TIMES DAILY
Qty: 60 TABLET | Refills: 3 | Status: SHIPPED | OUTPATIENT
Start: 2021-05-25 | End: 2021-06-08 | Stop reason: SDUPTHER

## 2021-05-27 ENCOUNTER — TELEPHONE (OUTPATIENT)
Dept: PSYCHIATRY | Facility: CLINIC | Age: 31
End: 2021-05-27

## 2021-05-27 RX ORDER — HYDROXYZINE 50 MG/1
50 TABLET, FILM COATED ORAL 3 TIMES DAILY PRN
Qty: 90 TABLET | Refills: 0 | Status: SHIPPED | OUTPATIENT
Start: 2021-05-27 | End: 2021-12-28

## 2021-05-27 NOTE — TELEPHONE ENCOUNTER
Her last appt was in Nov 2020, she had 2 NS since then , clonazeapm can not be increased, rx for increased dose of hydroxyzine 50 mg TID #90 was sent to the pharmacy

## 2021-05-27 NOTE — TELEPHONE ENCOUNTER
Patti is reporting increased anxiety. She is wondering if the dose of clonazepam or hydroxyzine can be increased. She has tried Buspar in the past with no improvement in symptoms. She also states that in the past she was on a higher dose of clonazepam.

## 2021-06-07 ENCOUNTER — TELEPHONE (OUTPATIENT)
Dept: PSYCHIATRY | Facility: CLINIC | Age: 31
End: 2021-06-07

## 2021-06-07 NOTE — TELEPHONE ENCOUNTER
Pt said pharmacy does not have correct script for med haloperidol (HALDOL) 2 MG tablet [4581] (Order [id number])

## 2021-06-08 RX ORDER — HALOPERIDOL 2 MG/1
2 TABLET ORAL 2 TIMES DAILY
Qty: 60 TABLET | Refills: 3 | Status: SHIPPED | OUTPATIENT
Start: 2021-06-08 | End: 2021-12-28

## 2021-06-17 ENCOUNTER — TELEPHONE (OUTPATIENT)
Dept: PSYCHIATRY | Facility: CLINIC | Age: 31
End: 2021-06-17

## 2021-06-17 NOTE — TELEPHONE ENCOUNTER
Pt called in, states her nightmare are getting worse, and wonders if needs to stop medication, and get up dose of anti depression med.

## 2021-06-21 NOTE — TELEPHONE ENCOUNTER
Patti is currently taking:  Clonazepam 0.5 mg  Haloperidol 2 mg  Hydroxyzine 50 mg  Lexapro 20 mg  Prazosin 2 mg  Seroquel 400 mg

## 2021-06-23 NOTE — TELEPHONE ENCOUNTER
Did Haldol help with anxiety and hallucinations or made it worse?     Did she try prozac in the past?   lexapro is on max dose and can not be increased, if she feels antidepressant is not effective, it needs to be changed     Is she taking deplin (or L methylfolate) ? She has reduced conversion of folic acid

## 2021-06-23 NOTE — TELEPHONE ENCOUNTER
Has tried Prozac in the past- had a bad experience with it. Haldol has improved anxiety and hallucinations. She is not taking Deplin .

## 2021-06-24 RX ORDER — LEVOMEFOLATE CALCIUM 15 MG
15 TABLET ORAL DAILY
Qty: 30 TABLET | Refills: 1 | Status: SHIPPED | OUTPATIENT
Start: 2021-06-24 | End: 2021-12-28

## 2021-06-24 NOTE — TELEPHONE ENCOUNTER
pristiq of effexor?   If lexapro is not effective , it has to be changed     She needs to take deplin (L methylfolate) - rx  Was sent to the pharmacy

## 2021-06-25 ENCOUNTER — TELEPHONE (OUTPATIENT)
Dept: URGENT CARE | Facility: CLINIC | Age: 31
End: 2021-06-25

## 2021-08-06 DIAGNOSIS — F31.60 BIPOLAR AFFECTIVE DISORDER, MIXED (HCC): ICD-10-CM

## 2021-08-06 DIAGNOSIS — F43.12 CHRONIC POST-TRAUMATIC STRESS DISORDER (PTSD): Chronic | ICD-10-CM

## 2021-08-06 RX ORDER — ARIPIPRAZOLE 5 MG/1
TABLET ORAL
Qty: 30 TABLET | Refills: 0 | Status: SHIPPED | OUTPATIENT
Start: 2021-08-06 | End: 2021-08-28

## 2021-08-06 RX ORDER — PRAZOSIN HYDROCHLORIDE 2 MG/1
CAPSULE ORAL
Qty: 30 CAPSULE | Refills: 0 | Status: SHIPPED | OUTPATIENT
Start: 2021-08-06 | End: 2022-01-27

## 2021-08-06 RX ORDER — QUETIAPINE FUMARATE 400 MG/1
400 TABLET, FILM COATED ORAL
Qty: 30 TABLET | Refills: 0 | Status: SHIPPED | OUTPATIENT
Start: 2021-08-06 | End: 2022-01-27

## 2021-08-06 RX ORDER — HYDROXYZINE HYDROCHLORIDE 25 MG/1
TABLET, FILM COATED ORAL
Qty: 90 TABLET | Refills: 0 | Status: SHIPPED | OUTPATIENT
Start: 2021-08-06

## 2021-08-06 RX ORDER — ESCITALOPRAM OXALATE 20 MG/1
TABLET ORAL
Qty: 30 TABLET | Refills: 0 | Status: SHIPPED | OUTPATIENT
Start: 2021-08-06 | End: 2021-08-28

## 2021-08-28 PROCEDURE — U0004 COV-19 TEST NON-CDC HGH THRU: HCPCS | Performed by: PHYSICIAN ASSISTANT

## 2021-12-28 PROCEDURE — U0004 COV-19 TEST NON-CDC HGH THRU: HCPCS | Performed by: FAMILY MEDICINE

## 2022-01-24 ENCOUNTER — TELEPHONE (OUTPATIENT)
Dept: BARIATRICS/WEIGHT MGMT | Facility: CLINIC | Age: 32
End: 2022-01-24

## 2022-01-25 ENCOUNTER — LAB (OUTPATIENT)
Dept: LAB | Facility: HOSPITAL | Age: 32
End: 2022-01-25

## 2022-01-25 ENCOUNTER — HOSPITAL ENCOUNTER (OUTPATIENT)
Dept: GENERAL RADIOLOGY | Facility: HOSPITAL | Age: 32
Discharge: HOME | End: 2022-01-25

## 2022-01-25 ENCOUNTER — HOSPITAL ENCOUNTER (OUTPATIENT)
Dept: CARDIOLOGY | Facility: HOSPITAL | Age: 32
Discharge: HOME | End: 2022-01-25

## 2022-01-25 LAB
25(OH)D3 SERPL-MCNC: 45.1 NG/ML (ref 30–100)
ALBUMIN SERPL-MCNC: 4.1 G/DL (ref 3.5–5.2)
ALBUMIN/GLOB SERPL: 1.3 G/DL
ALP SERPL-CCNC: 90 U/L (ref 39–117)
ALT SERPL W P-5'-P-CCNC: 43 U/L (ref 1–33)
ANION GAP SERPL CALCULATED.3IONS-SCNC: 5.8 MMOL/L (ref 5–15)
AST SERPL-CCNC: 24 U/L (ref 1–32)
BASOPHILS # BLD AUTO: 0.04 10*3/MM3 (ref 0–0.2)
BASOPHILS NFR BLD AUTO: 0.4 % (ref 0–1.5)
BILIRUB SERPL-MCNC: 0.2 MG/DL (ref 0–1.2)
BUN SERPL-MCNC: 16 MG/DL (ref 6–20)
BUN/CREAT SERPL: 16 (ref 7–25)
CALCIUM SPEC-SCNC: 9.6 MG/DL (ref 8.6–10.5)
CHLORIDE SERPL-SCNC: 105 MMOL/L (ref 98–107)
CHOLEST SERPL-MCNC: 173 MG/DL (ref 0–200)
CO2 SERPL-SCNC: 30.2 MMOL/L (ref 22–29)
CREAT SERPL-MCNC: 1 MG/DL (ref 0.57–1)
DEPRECATED RDW RBC AUTO: 42.5 FL (ref 37–54)
EOSINOPHIL # BLD AUTO: 0.28 10*3/MM3 (ref 0–0.4)
EOSINOPHIL NFR BLD AUTO: 3 % (ref 0.3–6.2)
ERYTHROCYTE [DISTWIDTH] IN BLOOD BY AUTOMATED COUNT: 14.9 % (ref 12.3–15.4)
GFR SERPL CREATININE-BSD FRML MDRD: 65 ML/MIN/1.73
GLOBULIN UR ELPH-MCNC: 3.1 GM/DL
GLUCOSE SERPL-MCNC: 92 MG/DL (ref 65–99)
HCT VFR BLD AUTO: 44.2 % (ref 34–46.6)
HDLC SERPL-MCNC: 35 MG/DL (ref 40–60)
HGB BLD-MCNC: 14.5 G/DL (ref 12–15.9)
IMM GRANULOCYTES # BLD AUTO: 0.05 10*3/MM3 (ref 0–0.05)
IMM GRANULOCYTES NFR BLD AUTO: 0.5 % (ref 0–0.5)
LDLC SERPL CALC-MCNC: 106 MG/DL (ref 0–100)
LDLC/HDLC SERPL: 2.9 {RATIO}
LYMPHOCYTES # BLD AUTO: 3.08 10*3/MM3 (ref 0.7–3.1)
LYMPHOCYTES NFR BLD AUTO: 32.5 % (ref 19.6–45.3)
MCH RBC QN AUTO: 26.3 PG (ref 26.6–33)
MCHC RBC AUTO-ENTMCNC: 32.8 G/DL (ref 31.5–35.7)
MCV RBC AUTO: 80.1 FL (ref 79–97)
MONOCYTES # BLD AUTO: 0.6 10*3/MM3 (ref 0.1–0.9)
MONOCYTES NFR BLD AUTO: 6.3 % (ref 5–12)
NEUTROPHILS NFR BLD AUTO: 5.44 10*3/MM3 (ref 1.7–7)
NEUTROPHILS NFR BLD AUTO: 57.3 % (ref 42.7–76)
NRBC BLD AUTO-RTO: 0 /100 WBC (ref 0–0.2)
PLATELET # BLD AUTO: 271 10*3/MM3 (ref 140–450)
PMV BLD AUTO: 9.4 FL (ref 6–12)
POTASSIUM SERPL-SCNC: 4.5 MMOL/L (ref 3.5–5.2)
PROT SERPL-MCNC: 7.2 G/DL (ref 6–8.5)
RBC # BLD AUTO: 5.52 10*6/MM3 (ref 3.77–5.28)
SODIUM SERPL-SCNC: 141 MMOL/L (ref 136–145)
TRIGL SERPL-MCNC: 183 MG/DL (ref 0–150)
TSH SERPL DL<=0.05 MIU/L-ACNC: 0.84 UIU/ML (ref 0.27–4.2)
VLDLC SERPL-MCNC: 32 MG/DL (ref 5–40)
WBC NRBC COR # BLD: 9.49 10*3/MM3 (ref 3.4–10.8)

## 2022-01-25 PROCEDURE — 93010 ELECTROCARDIOGRAM REPORT: CPT | Performed by: INTERNAL MEDICINE

## 2022-01-25 PROCEDURE — 80307 DRUG TEST PRSMV CHEM ANLYZR: CPT

## 2022-01-25 PROCEDURE — 82306 VITAMIN D 25 HYDROXY: CPT

## 2022-01-25 PROCEDURE — 80050 GENERAL HEALTH PANEL: CPT

## 2022-01-25 PROCEDURE — 84425 ASSAY OF VITAMIN B-1: CPT

## 2022-01-25 PROCEDURE — 71046 X-RAY EXAM CHEST 2 VIEWS: CPT

## 2022-01-25 PROCEDURE — 83036 HEMOGLOBIN GLYCOSYLATED A1C: CPT

## 2022-01-25 PROCEDURE — 36415 COLL VENOUS BLD VENIPUNCTURE: CPT

## 2022-01-25 PROCEDURE — 80061 LIPID PANEL: CPT

## 2022-01-25 PROCEDURE — 80305 DRUG TEST PRSMV DIR OPT OBS: CPT

## 2022-01-25 PROCEDURE — 93005 ELECTROCARDIOGRAM TRACING: CPT | Performed by: NURSE PRACTITIONER

## 2022-01-26 DIAGNOSIS — F12.10 MILD TETRAHYDROCANNABINOL (THC) ABUSE: Primary | ICD-10-CM

## 2022-01-26 LAB
AMPHET+METHAMPHET UR QL: NEGATIVE
BARBITURATES UR QL SCN: NEGATIVE
BENZODIAZ UR QL SCN: POSITIVE
CANNABINOIDS SERPL QL: POSITIVE
COCAINE UR QL: NEGATIVE
HBA1C MFR BLD: 6.5 % (ref 3.5–5.6)
METHADONE UR QL SCN: NEGATIVE
OPIATES UR QL: NEGATIVE
OXYCODONE UR QL SCN: NEGATIVE
QT INTERVAL: 364 MS

## 2022-01-27 ENCOUNTER — CONSULT (OUTPATIENT)
Dept: BARIATRICS/WEIGHT MGMT | Facility: CLINIC | Age: 32
End: 2022-01-27

## 2022-01-27 ENCOUNTER — TELEPHONE (OUTPATIENT)
Dept: BARIATRICS/WEIGHT MGMT | Facility: CLINIC | Age: 32
End: 2022-01-27

## 2022-01-27 ENCOUNTER — PREP FOR SURGERY (OUTPATIENT)
Dept: OTHER | Facility: HOSPITAL | Age: 32
End: 2022-01-27

## 2022-01-27 VITALS
TEMPERATURE: 96.7 F | BODY MASS INDEX: 44.41 KG/M2 | WEIGHT: 293 LBS | DIASTOLIC BLOOD PRESSURE: 83 MMHG | RESPIRATION RATE: 18 BRPM | HEART RATE: 103 BPM | HEIGHT: 68 IN | SYSTOLIC BLOOD PRESSURE: 128 MMHG | OXYGEN SATURATION: 94 %

## 2022-01-27 DIAGNOSIS — Z01.818 PRE-OP EXAMINATION: ICD-10-CM

## 2022-01-27 DIAGNOSIS — Z71.3 NUTRITIONAL COUNSELING: ICD-10-CM

## 2022-01-27 LAB
COTININE UR QL SCN: POSITIVE NG/ML
Lab: ABNORMAL

## 2022-01-27 PROCEDURE — 99215 OFFICE O/P EST HI 40 MIN: CPT | Performed by: NURSE PRACTITIONER

## 2022-01-27 RX ORDER — PRAZOSIN HYDROCHLORIDE 5 MG/1
10 CAPSULE ORAL
COMMUNITY

## 2022-01-27 RX ORDER — NORGESTIMATE AND ETHINYL ESTRADIOL 0.25-0.035
1 KIT ORAL DAILY
COMMUNITY
Start: 2021-12-31

## 2022-01-27 RX ORDER — GLIMEPIRIDE 4 MG/1
4 TABLET ORAL DAILY
COMMUNITY
End: 2023-03-27

## 2022-01-27 RX ORDER — QUETIAPINE FUMARATE 300 MG/1
150 TABLET, FILM COATED ORAL 2 TIMES DAILY
COMMUNITY

## 2022-01-27 RX ORDER — SODIUM CHLORIDE 9 MG/ML
30 INJECTION, SOLUTION INTRAVENOUS CONTINUOUS PRN
Status: CANCELLED | OUTPATIENT
Start: 2022-01-27

## 2022-01-27 RX ORDER — OMEGA-3S/DHA/EPA/FISH OIL/D3 300MG-1000
400 CAPSULE ORAL DAILY
COMMUNITY

## 2022-01-27 RX ORDER — CLONAZEPAM 1 MG/1
1 TABLET ORAL 2 TIMES DAILY
COMMUNITY
End: 2022-01-27

## 2022-01-27 RX ORDER — BREXPIPRAZOLE 2 MG/1
2 TABLET ORAL DAILY
COMMUNITY

## 2022-01-27 RX ORDER — NABUMETONE 500 MG/1
500 TABLET, FILM COATED ORAL EVERY 12 HOURS SCHEDULED
COMMUNITY

## 2022-01-27 RX ORDER — LANCETS
1 EACH MISCELLANEOUS 2 TIMES DAILY
COMMUNITY
Start: 2021-12-07 | End: 2023-03-27 | Stop reason: SDUPTHER

## 2022-01-27 RX ORDER — TIZANIDINE 2 MG/1
2 TABLET ORAL EVERY 8 HOURS SCHEDULED
COMMUNITY
End: 2023-03-27 | Stop reason: SDUPTHER

## 2022-01-27 RX ORDER — SODIUM CHLORIDE 0.9 % (FLUSH) 0.9 %
10 SYRINGE (ML) INJECTION AS NEEDED
Status: CANCELLED | OUTPATIENT
Start: 2022-01-27

## 2022-01-27 NOTE — TELEPHONE ENCOUNTER
Spoke with referral dept. With [facility]. States last seen Oct. 14, 2021. And will fax over cardiac clearance. If they have any issues, they will call us they said.
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
## 2022-02-02 ENCOUNTER — PATIENT ROUNDING (BHMG ONLY) (OUTPATIENT)
Dept: BARIATRICS/WEIGHT MGMT | Facility: CLINIC | Age: 32
End: 2022-02-02

## 2022-02-02 NOTE — PROGRESS NOTES
February 2, 2022    Hello, may I speak with Patti Oquendo?    My name is BRADY  I am  with [facility] SURG[facility]  [address] [place] [address].    Before we get started may I verify your date of birth? 1990    I am calling to officially welcome you to our practice and ask about your recent visit. Is this a good time to talk? yes    Tell me about your visit with us. What things went well?  It was fine.        We're always looking for ways to make our patients' experiences even better. Do you have recommendations on ways we may improve?  no    Overall were you satisfied with your first visit to our practice? yes       I appreciate you taking the time to speak with me today. Is there anything else I can do for you? no      Thank you, and have a great day.

## 2022-02-03 LAB — VIT B1 BLD-SCNC: 247.4 NMOL/L (ref 66.5–200)

## 2022-02-16 ENCOUNTER — TELEMEDICINE (OUTPATIENT)
Dept: BARIATRICS/WEIGHT MGMT | Facility: CLINIC | Age: 32
End: 2022-02-16

## 2022-02-16 DIAGNOSIS — E66.01 OBESITY, CLASS III, BMI 40-49.9 (MORBID OBESITY): Primary | ICD-10-CM

## 2022-02-16 DIAGNOSIS — Z71.3 NUTRITIONAL COUNSELING: ICD-10-CM

## 2022-02-16 PROCEDURE — 99214 OFFICE O/P EST MOD 30 MIN: CPT | Performed by: NURSE PRACTITIONER

## 2022-02-16 NOTE — PROGRESS NOTES
[facility] GROUP BARIATRIC SURGERY  [address] [place] [address]  [address] [place] [address]  Dept: [phone]  2/16/2022      Patti Oquendo.  [id number]  [id number]  1990  female    You have chosen to receive care through a video visit. Do you consent to use a video visit for you medical care today? Yes    SWL #2   Current weight: 389.3 pounds     The patient is here for month 2 of their pre-operative physician supervised diet. She had a loss of 6 lbs. The patient states that she is following the recommendations given by our office and dietician including a high lean protein, low carb and low fat diet. We recommended adequate fruits and vegetable intake along with limited portion sizes. Patient is working on eliminating fast foods, fried foods, sweets and soda. Patti Oquendo has been increasing her daily water intake. She has been exercising: none due to recently being sick with stomach bug.    Patient states they have made positive changes including increased protein , no carbonation   The patient admits to be struggling with sweet cravings- using fruit instead of sweets  To help with cravings    Breakfast: none recently, but in the past smoothies with high protein yogurt and fruit and milk  Lunch: soup, sandwich  Dinner: meat and veggie ( frozen mixed veggies)  Snacks: salad or apple or orange or banana   Drinks: koolaid, tea with splenda  , coffee  Exercise: none recently due to being sick with stomach bug      Past goals:  Eating and drinking separately with 1 meal a day - partially met   Eating slowly over 30 minutes and chew food 10-15 times a bite - partially met     Review of Systems   Constitutional: Positive for fatigue.   Respiratory: Negative.    Cardiovascular: Negative.    Gastrointestinal: Negative.    Musculoskeletal: Positive for back pain and myalgias.     Height 68 inches weight 389.48, BMI 59.19  Patient  Active Problem List   Diagnosis   • Abdominal pain, lower   • Acute exacerbation of chronic bronchitis (HCC)   • Anxiety   • Asthma   • Chronic post-traumatic stress disorder (PTSD)   • Constipation   • Encounter for laboratory testing for COVID-19 virus   • Foot pain   • Insomnia   • Pseudobulbar affect   • Viral upper respiratory tract infection   • Wheezing   • Bipolar affective disorder, mixed ([facility])   • Suspected COVID-19 virus infection   • Body mass index (BMI) of 60.0-69.9 in adult ([facility])       The following portions of the patient's history were reviewed and updated as appropriate: active problem list, medication list, allergies, social history, notes from last encounter    Physical Exam  Constitutional:       Appearance: Normal appearance. She is obese.   Cardiovascular:      Comments: Unable to assess due to video visit   Pulmonary:      Comments: Unable to assess due to video visit   Abdominal:      Comments: Unable to assess due to video visit    Neurological:      General: No focal deficit present.      Mental Status: She is alert and oriented to person, place, and time.   Psychiatric:         Mood and Affect: Mood normal.         Behavior: Behavior normal.         Thought Content: Thought content normal.         Judgment: Judgment normal.     limited due to video visit     Discussion/Plan:    New goals:  Eat something high protein for breakfast   Eating and drinking separately with 1 meal a day   Eating slowly over 30 minutes and chew food 10-15 times a bite   Exercise 10 minutes each day     Obesity/Morbid Obesity: Currently the patient's weight is decreased. There are no medications prescribed.Treatment plan includes prescribed diet, prescribed exercise regimen and behavior modification.    I reviewed the appropriate dietary choices with the patient and encouraged the necessary changes. Recommended at least 70 grams of protein per day, around 35 grams of fats and less than 100 grams of carbohydrates.  Reviewed calorie intake if patient wanted to calorie count and/or had BMR. Instructed patient to drink half of body weight in ounces per day and exercise a minimum of 150 minutes per week including both cardio and strength training. Discussed the option of keeping a food journal which will help patient become more aware of the nutritional value of foods so they are more prepared after surgery.    The patient was given written materials from our office for education.   I answered all of the patients questions regarding dietary changes, exercise or surgical options.  The patient will follow up in 1 month. The total time spent face to face was 30 minutes with 25 minutes spent counseling.    ELPIDIO Woodruff  [facility] Bariatrics and General surgery

## 2022-02-21 ENCOUNTER — TELEPHONE (OUTPATIENT)
Dept: BARIATRICS/WEIGHT MGMT | Facility: CLINIC | Age: 32
End: 2022-02-21

## 2022-02-21 VITALS — BODY MASS INDEX: 44.41 KG/M2 | HEIGHT: 68 IN | WEIGHT: 293 LBS

## 2022-02-21 NOTE — TELEPHONE ENCOUNTER
Spoke with Patti. We had her sched. For an EGD for 4.18.22, however the hospital said that another doctor had that time blocked. We RS her EGD to 4.21.22 @ 12 p.m. I resent instructions via LIFT12 for procedure

## 2022-03-07 ENCOUNTER — OFFICE (OUTPATIENT)
Dept: URBAN - METROPOLITAN AREA CLINIC 64 | Facility: CLINIC | Age: 32
End: 2022-03-07
Payer: COMMERCIAL

## 2022-03-07 VITALS
WEIGHT: 293 LBS | HEART RATE: 107 BPM | HEIGHT: 70 IN | DIASTOLIC BLOOD PRESSURE: 84 MMHG | SYSTOLIC BLOOD PRESSURE: 119 MMHG

## 2022-03-07 DIAGNOSIS — K43.9 VENTRAL HERNIA WITHOUT OBSTRUCTION OR GANGRENE: ICD-10-CM

## 2022-03-07 DIAGNOSIS — R11.2 NAUSEA WITH VOMITING, UNSPECIFIED: ICD-10-CM

## 2022-03-07 PROCEDURE — 99214 OFFICE O/P EST MOD 30 MIN: CPT | Performed by: INTERNAL MEDICINE

## 2022-03-11 NOTE — TELEPHONE ENCOUNTER
PATIENT STATES SHE DID TRY RISPERIDONE WHEN SHE WAS YOUNGER BUT DOESN'T REMEMBER HOW IT WORKED BUT WOULD LIKE TO TRY IT AGAIN    yes

## 2022-03-14 RX ORDER — LORAZEPAM 1 MG/1
1 TABLET ORAL EVERY 8 HOURS PRN
COMMUNITY
End: 2023-03-27

## 2022-03-16 ENCOUNTER — TELEPHONE (OUTPATIENT)
Dept: BARIATRICS/WEIGHT MGMT | Facility: CLINIC | Age: 32
End: 2022-03-16

## 2022-03-16 NOTE — TELEPHONE ENCOUNTER
"Pt saw [facility] psychology on 3/13/22. [facility] recommended stated pt is \" not a candidate for bariatric surgery at this time due to hx of schizoaffective disorder and borderline personality disorder.\" I called the pt and discussed this with her and stated that unfortunately for her health and safety, it would not be recommended to pursue bariatric surgery at this time. Pt verbalized understanding. Will cancel her future apts with this office. Pt encouraged to follow up with PCP for other weight loss options instead of surgery.   "

## 2022-03-18 ENCOUNTER — LAB (OUTPATIENT)
Dept: LAB | Facility: HOSPITAL | Age: 32
End: 2022-03-18

## 2022-03-18 LAB — SARS-COV-2 ORF1AB RESP QL NAA+PROBE: NOT DETECTED

## 2022-03-18 PROCEDURE — C9803 HOPD COVID-19 SPEC COLLECT: HCPCS

## 2022-03-18 PROCEDURE — U0004 COV-19 TEST NON-CDC HGH THRU: HCPCS

## 2022-03-21 ENCOUNTER — ANESTHESIA (OUTPATIENT)
Dept: GASTROENTEROLOGY | Facility: HOSPITAL | Age: 32
End: 2022-03-21

## 2022-03-21 ENCOUNTER — HOSPITAL ENCOUNTER (OUTPATIENT)
Facility: HOSPITAL | Age: 32
Setting detail: HOSPITAL OUTPATIENT SURGERY
Discharge: HOME | End: 2022-03-21
Attending: INTERNAL MEDICINE | Admitting: INTERNAL MEDICINE

## 2022-03-21 ENCOUNTER — ON CAMPUS - OUTPATIENT (OUTPATIENT)
Dept: URBAN - METROPOLITAN AREA HOSPITAL 85 | Facility: HOSPITAL | Age: 32
End: 2022-03-21
Payer: COMMERCIAL

## 2022-03-21 ENCOUNTER — ANESTHESIA EVENT (OUTPATIENT)
Dept: GASTROENTEROLOGY | Facility: HOSPITAL | Age: 32
End: 2022-03-21

## 2022-03-21 VITALS
HEART RATE: 86 BPM | TEMPERATURE: 97.7 F | WEIGHT: 293 LBS | DIASTOLIC BLOOD PRESSURE: 55 MMHG | OXYGEN SATURATION: 97 % | RESPIRATION RATE: 22 BRPM | HEIGHT: 70 IN | SYSTOLIC BLOOD PRESSURE: 107 MMHG | BODY MASS INDEX: 41.95 KG/M2

## 2022-03-21 DIAGNOSIS — K21.9 GASTRO-ESOPHAGEAL REFLUX DISEASE WITHOUT ESOPHAGITIS: ICD-10-CM

## 2022-03-21 DIAGNOSIS — R19.7 DIARRHEA, UNSPECIFIED: ICD-10-CM

## 2022-03-21 DIAGNOSIS — R13.10 DYSPHAGIA: ICD-10-CM

## 2022-03-21 DIAGNOSIS — R19.7 DIARRHEA: ICD-10-CM

## 2022-03-21 DIAGNOSIS — R19.4 CHANGE IN BOWEL HABITS: ICD-10-CM

## 2022-03-21 DIAGNOSIS — K21.9 GERD (GASTROESOPHAGEAL REFLUX DISEASE): ICD-10-CM

## 2022-03-21 DIAGNOSIS — R13.10 DYSPHAGIA, UNSPECIFIED: ICD-10-CM

## 2022-03-21 DIAGNOSIS — K29.50 UNSPECIFIED CHRONIC GASTRITIS WITHOUT BLEEDING: ICD-10-CM

## 2022-03-21 DIAGNOSIS — D12.3 BENIGN NEOPLASM OF TRANSVERSE COLON: ICD-10-CM

## 2022-03-21 DIAGNOSIS — R19.4 CHANGE IN BOWEL HABIT: ICD-10-CM

## 2022-03-21 LAB — GLUCOSE BLDC GLUCOMTR-MCNC: 161 MG/DL (ref 70–105)

## 2022-03-21 PROCEDURE — 43239 EGD BIOPSY SINGLE/MULTIPLE: CPT | Mod: 51 | Performed by: INTERNAL MEDICINE

## 2022-03-21 PROCEDURE — 82962 GLUCOSE BLOOD TEST: CPT

## 2022-03-21 PROCEDURE — 25010000002 PROPOFOL 200 MG/20ML EMULSION

## 2022-03-21 PROCEDURE — 45380 COLONOSCOPY AND BIOPSY: CPT | Mod: 59 | Performed by: INTERNAL MEDICINE

## 2022-03-21 PROCEDURE — 43450 DILATE ESOPHAGUS 1/MULT PASS: CPT | Performed by: INTERNAL MEDICINE

## 2022-03-21 PROCEDURE — 45385 COLONOSCOPY W/LESION REMOVAL: CPT | Performed by: INTERNAL MEDICINE

## 2022-03-21 PROCEDURE — 88305 TISSUE EXAM BY PATHOLOGIST: CPT | Performed by: INTERNAL MEDICINE

## 2022-03-21 RX ORDER — SODIUM CHLORIDE 0.9 % (FLUSH) 0.9 %
10 SYRINGE (ML) INJECTION AS NEEDED
Status: DISCONTINUED | OUTPATIENT
Start: 2022-03-21 | End: 2022-03-21 | Stop reason: HOSPADM

## 2022-03-21 RX ORDER — LIDOCAINE HYDROCHLORIDE 20 MG/ML
INJECTION, SOLUTION EPIDURAL; INFILTRATION; INTRACAUDAL; PERINEURAL AS NEEDED
Status: DISCONTINUED | OUTPATIENT
Start: 2022-03-21 | End: 2022-03-21 | Stop reason: SURG

## 2022-03-21 RX ORDER — SODIUM CHLORIDE 0.9 % (FLUSH) 0.9 %
3 SYRINGE (ML) INJECTION EVERY 12 HOURS SCHEDULED
Status: DISCONTINUED | OUTPATIENT
Start: 2022-03-21 | End: 2022-03-21 | Stop reason: HOSPADM

## 2022-03-21 RX ORDER — ONDANSETRON 2 MG/ML
4 INJECTION INTRAMUSCULAR; INTRAVENOUS ONCE AS NEEDED
Status: DISCONTINUED | OUTPATIENT
Start: 2022-03-21 | End: 2022-03-21 | Stop reason: HOSPADM

## 2022-03-21 RX ORDER — PROPOFOL 10 MG/ML
INJECTION, EMULSION INTRAVENOUS AS NEEDED
Status: DISCONTINUED | OUTPATIENT
Start: 2022-03-21 | End: 2022-03-21 | Stop reason: SURG

## 2022-03-21 RX ORDER — GLYCOPYRROLATE 0.2 MG/ML
INJECTION INTRAMUSCULAR; INTRAVENOUS AS NEEDED
Status: DISCONTINUED | OUTPATIENT
Start: 2022-03-21 | End: 2022-03-21 | Stop reason: SURG

## 2022-03-21 RX ORDER — SODIUM CHLORIDE 9 MG/ML
30 INJECTION, SOLUTION INTRAVENOUS CONTINUOUS PRN
Status: DISCONTINUED | OUTPATIENT
Start: 2022-03-21 | End: 2022-03-21 | Stop reason: HOSPADM

## 2022-03-21 RX ADMIN — GLYCOPYRROLATE 0.2 MG: 0.2 INJECTION, SOLUTION INTRAMUSCULAR; INTRAVENOUS at 12:09

## 2022-03-21 RX ADMIN — SODIUM CHLORIDE 30 ML/HR: 9 INJECTION, SOLUTION INTRAVENOUS at 11:46

## 2022-03-21 RX ADMIN — LIDOCAINE HYDROCHLORIDE 200 MG: 20 INJECTION, SOLUTION EPIDURAL; INFILTRATION; INTRACAUDAL; PERINEURAL at 12:09

## 2022-03-21 RX ADMIN — PROPOFOL 650 MG: 10 INJECTION, EMULSION INTRAVENOUS at 12:09

## 2022-03-21 NOTE — OP NOTE
ESOPHAGOGASTRODUODENOSCOPY, COLONOSCOPY Procedure Report    Patient Name:  Patti Oquendo  YOB: 1990    Date of Surgery:  3/21/2022     Pre-Op Diagnosis:  Dysphagia [R13.10]  GERD (gastroesophageal reflux disease) [K21.9]  Diarrhea [R19.7]  Change in bowel habits [R19.4]    Postop diagnosis:  1.  Normal EGD  2.  Colon polyp    Procedure/CPT® Codes:      Procedure(s):  ESOPHAGOGASTRODUODENOSCOPY WITH BIOPSY X2 AREAS AND DILATION UP TO 54MM  COLONOSCOPY with biopsy plus snare polypectomy    Staff:  Surgeon(s):  Fredrick Alvarez MD      Anesthesia: Monitored Anesthesia Care    Description of Procedure:  A description of the procedure as well as risks, benefits and alternative methods were explained to the patient who voiced understanding and signed the corresponding consent form. A physical exam was performed and vital signs were monitored throughout the procedure.    An upper GI endoscope was placed into the mouth and proceeded through the esophagus, stomach and second portion of the duodenum without difficulty. The scope was then retroflexed and the fundus was visualized. The procedure was not difficult and there were no immediate complications.  There was no blood loss.    Next, A rectal exam was performed which was normal. An Olympus colonoscope was placed into the rectum and proceeded under direct visualization through the colon until the cecum and appendiceal orifice were identified. Careful visualization occurred upon slow withdraw of the scope. The scope was then retroflexed and the distal rectum was visualized. The quality of the prep was good. The procedure was not difficult and there were no immediate complications.  There was no blood loss.    Impression:  EGD findings:  1.  Normal esophageal mucosa entire esophagus, 54 Hebrew nonguided bougie dilator was advanced blindly to the esophagus with minimal to no resistance  2.  Normal gastric mucosa entire stomach, cold forcep biopsies of the  antrum and body were taken for us to pathology and H. pylori  3.  Normal duodenal mucosa visualized to D3, cold forcep biopsies were taken to rule out celiac    Colonoscopy findings:  1.  1 polyp in the transverse colon that was 4 mm and sessile removed via cold snare and sent for us to pathology  2.  Normal colonic mucosa entire colon, cold forcep biopsies of the random colon were taken for microscopic colitis  3.  Normal terminal ileum mucosa visualized 15 cm into the TI      Recommendations:  1.  Follow-up biopsy results and treat H. pylori if positive  2.  PPI daily  3.  Repeat colonoscopy in 7 years if polyps are adenomatous and 10 years of polyps are hyperplastic  4.  Continue Bentyl  5.  Follow-up in clinic in 2 months      Fredrick Alvarez MD     Date: 3/21/2022    Time: 12:19 EDT

## 2022-03-21 NOTE — ANESTHESIA PREPROCEDURE EVALUATION
Anesthesia Evaluation     Patient summary reviewed and Nursing notes reviewed   NPO Solid Status: > 8 hours  NPO Liquid Status: > 8 hours           Airway   Mallampati: III  TM distance: >3 FB  Neck ROM: full  Possible difficult intubation  Dental - normal exam     Pulmonary    (+) COPD, asthma,recent URI, sleep apnea,   Cardiovascular         Neuro/Psych  (+) psychiatric history,    GI/Hepatic/Renal/Endo    (+) morbid obesity,  thyroid problem     Musculoskeletal     Abdominal    Substance History      OB/GYN          Other   arthritis,                      Anesthesia Plan    ASA 4     MAC     intravenous induction     Anesthetic plan, all risks, benefits, and alternatives have been provided, discussed and informed consent has been obtained with: patient.    Plan discussed with CRNA and CAA.        CODE STATUS:

## 2022-03-21 NOTE — DISCHARGE INSTRUCTIONS
A responsible adult should stay with you and you should rest quietly for the rest of the day.    Do not drink alcohol, drive, operate any heavy machinery or power tools or make any legal/important decisions for the next 24 hours.     Progress your diet as tolerated.  If you begin to experience severe pain, increased shortness of breath, racing heartbeat or a fever above 101 F, seek immediate medical attention.     Follow up with MD as instructed. Call office for results in 3 to 5 days if needed.     DR HOOD [phone]    Impression:  EGD findings:  1.  Normal esophageal mucosa entire esophagus, 54 Grenadian nonguided bougie dilator was advanced blindly to the esophagus with minimal to no resistance  2.  Normal gastric mucosa entire stomach, cold forcep biopsies of the antrum and body were taken for us to pathology and H. pylori  3.  Normal duodenal mucosa visualized to D3, cold forcep biopsies were taken to rule out celiac     Colonoscopy findings:  1.  1 polyp in the transverse colon that was 4 mm and sessile removed via cold snare and sent for us to pathology  2.  Normal colonic mucosa entire colon, cold forcep biopsies of the random colon were taken for microscopic colitis  3.  Normal terminal ileum mucosa visualized 15 cm into the TI        Recommendations:  1.  Follow-up biopsy results and treat H. pylori if positive  2.  PPI daily  3.  Repeat colonoscopy in 7 years if polyps are adenomatous and 10 years of polyps are hyperplastic  4.  Continue Bentyl  5.  Follow-up in clinic in 2 months

## 2022-03-21 NOTE — H&P
GI CONSULT  NOTE:    Referring Provider:    Tenzin Joyner MD  [unfilled]    Chief complaint: <principal problem not specified>    Subjective .       Pre op diagnosis  Dysphagia [R13.10]  GERD (gastroesophageal reflux disease) [K21.9]  Diarrhea [R19.7]  Change in bowel habits [R19.4]      History of present illness:      Patti Oquendo is a 32 y.o. female who presents today for Procedure(s):  ESOPHAGOGASTRODUODENOSCOPY  COLONOSCOPY for the indications listed below.     The updated Patient Profile was reviewed prior to the procedure, in conjunction with the Physical Exam, including medical conditions, surgical procedures, medications, allergies, family history and social history.     Pre-operatively, I reviewed the indication(s) for the procedure, the risks of the procedure [including but not limited to: unexpected bleeding possibly requiring hospitalization and/or unplanned repeat procedures, perforation possibly requiring surgical treatment, missed lesions and complications of sedation/MAC (also explained by anesthesia staff)].     I have evaluated the patient for risks associated with the planned anesthesia and the procedure to be performed and find the patient an acceptable candidate for IV sedation.    Multiple opportunities were provided for any questions or concerns, and all questions were answered satisfactorily before any anesthesia was administered. We will proceed with the planned procedure.    Past Medical History:  Past Medical History:   Diagnosis Date   • Anxiety    • Bipolar disorder (HCC)    • Chronic bronchitis (HCC)    • Depression    • DJD (degenerative joint disease)    • Obese    • Panic disorder     [facility]   • PTSD (post-traumatic stress disorder)    • Sleep apnea    • Thyroid nodule        Past Surgical History:  Past Surgical History:   Procedure Laterality Date   • ABDOMINAL SURGERY      LEEP procedure   •  SECTION  2010, 2015    x 2   •  CHOLECYSTECTOMY  2008   • GASTRECTOMY         Social History:  Social History     Tobacco Use   • Smoking status: Former Smoker     Packs/day: 1.00     Years: 24.00     Pack years: 24.00     Types: Cigarettes     Quit date: 2021     Years since quittin.8   • Smokeless tobacco: Never Used   • Tobacco comment: weaning from e cig   Vaping Use   • Vaping Use: Every day   • Substances: Nicotine, Delta 88 THC    Substance Use Topics   • Alcohol use: Not Currently   • Drug use: No       Family History:  Family History   Problem Relation Age of Onset   • Diabetes Other    • Hypertension Other    • Cancer Other         Colon, Breast, Uterine Cancer   • Hypertension Mother    • Heart attack Mother 43         age 43   • Cancer Mother         colon   • Obesity Father    • Hypertension Father 50   • No Known Problems Sister    • No Known Problems Brother    • No Known Problems Maternal Grandmother    • No Known Problems Maternal Grandfather    • Obesity Paternal Grandmother    • Diabetes Paternal Grandmother    • Hypertension Paternal Grandmother    • Stroke Paternal Grandmother         x3 - 50's   • Sleep apnea Paternal Grandmother        Medications:  Medications Prior to Admission   Medication Sig Dispense Refill Last Dose   • Accu-Chek Softclix Lancets lancets 1 each 2 (Two) Times a Day. Test blood sugar   3/21/2022 at Unknown time   • albuterol sulfate  (90 Base) MCG/ACT inhaler Inhale 2 puffs Every 4 (Four) Hours As Needed for Wheezing. 1 inhaler 0 Past Week at Unknown time   • Brexpiprazole (Rexulti) 2 MG tablet Take 2 mg by mouth Daily.   3/20/2022 at Unknown time   • Cariprazine HCl (Vraylar) 4.5 MG capsule Vraylar 4.5 mg capsule   TAKE ONE CAPSULE BY MOUTH ONCE DAILY   3/20/2022 at Unknown time   • cholecalciferol (VITAMIN D3) 10 MCG (400 UNIT) tablet Take 400 Units by mouth Daily.   3/20/2022 at Unknown time   • Estarylla 0.25-35 MG-MCG per tablet Take 1 tablet by mouth Daily.   3/20/2022 at  Unknown time   • glimepiride (AMARYL) 4 MG tablet Take 4 mg by mouth Daily.   3/20/2022 at Unknown time   • hydrOXYzine (ATARAX) 25 MG tablet TAKE ONE TABLET BY MOUTH THREE TIMES DAILY AS NEEDED FOR ANXIETY (Patient taking differently: Take 50 mg by mouth Every 8 (Eight) Hours As Needed.) 90 tablet 0 3/20/2022 at Unknown time   • Jardiance 10 MG tablet tablet Take 10 mg by mouth Daily.   3/20/2022 at Unknown time   • LORazepam (ATIVAN) 1 MG tablet Take 1 mg by mouth Every 8 (Eight) Hours As Needed for Anxiety.   3/20/2022 at Unknown time   • metFORMIN (GLUCOPHAGE) 1000 MG tablet Take 1,000 mg by mouth 2 (Two) Times a Day.   3/20/2022 at Unknown time   • nabumetone (RELAFEN) 500 MG tablet Take 500 mg by mouth Every 12 (Twelve) Hours.   3/20/2022 at Unknown time   • omeprazole (priLOSEC) 40 MG capsule    3/20/2022 at Unknown time   • prazosin (MINIPRESS) 5 MG capsule Take 10 mg by mouth every night at bedtime.   3/20/2022 at Unknown time   • QUEtiapine (SEROquel) 300 MG tablet Take 150 mg by mouth 2 (Two) Times a Day.   3/20/2022 at Unknown time   • tiZANidine (ZANAFLEX) 2 MG tablet Take 2 mg by mouth Every 8 (Eight) Hours.   3/20/2022 at Unknown time   • B Complex Vitamins (VITAMIN B COMPLEX PO) Take 1 each by mouth Daily.      • clonazePAM (KlonoPIN) 0.5 MG tablet Take 1 tablet by mouth 2 (Two) Times a Day As Needed for Anxiety. for anxiety (Patient taking differently: Take 1 mg by mouth 2 (Two) Times a Day. for anxiety) 60 tablet 3        Scheduled Meds:   Continuous Infusions:sodium chloride, 30 mL/hr, Last Rate: 30 mL/hr (03/21/22 1146)      PRN Meds:.•  sodium chloride  •  sodium chloride    ALLERGIES:  Adhesive tape and Latex    ROS:  The following systems were reviewed and negative;  Constitution:  No fevers, chills, no unintentional weight loss  Skin: no rash, no jaundice  Eyes:  No blurry vision, no eye pain  HENT:  No change in hearing or smell  Resp:  No dyspnea or cough  CV:  No chest pain or  "palpitations  :  No dysuria, hematuria  Musculoskeletal:  No leg cramps or arthralgias  Neuro:  No tremor, no numbness  Psych:  No depression or confsusion    Objective     Vital Signs:   Vitals:    03/14/22 1457 03/21/22 1134   BP:  135/94   BP Location:  Left arm   Patient Position:  Sitting   Pulse:  93   Resp:  17   Temp:  97.7 °F (36.5 °C)   TempSrc:  Oral   SpO2:  93%   Weight: (!) 177 kg (390 lb) (!) 176 kg (389 lb 1.8 oz)   Height: 177.8 cm (70\") 177.8 cm (70\")       Physical Exam:       General Appearance:    Awake and alert, in no acute distress   Head:    Normocephalic, without obvious abnormality, atraumatic   Throat:   No oral lesions, no thrush, oral mucosa moist   Lungs:     respirations regular, even and unlabored   Skin:   No rash, no jaundice       Results Review:  Lab Results (last 24 hours)     Procedure Component Value Units Date/Time    POC Glucose Once [[id number]]  (Abnormal) Collected: 03/21/22 1103    Specimen: Blood Updated: 03/21/22 1104     Glucose 161 mg/dL      Comment: Serial Number: [id number]:  [id number]             Imaging Results (Last 24 Hours)     ** No results found for the last 24 hours. **           I reviewed the patient's labs and imaging.    ASSESSMENT AND PLAN:      Active Problems:    * No active hospital problems. *       Procedure(s):  ESOPHAGOGASTRODUODENOSCOPY  COLONOSCOPY      I discussed the patient's findings and my recommendations with the patient.    Fredrick Alvarez MD  03/21/22  12:02 EDT              "

## 2022-03-21 NOTE — ANESTHESIA POSTPROCEDURE EVALUATION
Patient: Patti Oquendo    Procedure Summary     Date: 03/21/22 Room / Location: [facility] ENDOSCOPY 1 / [facility] ENDOSCOPY    Anesthesia Start: 1201 Anesthesia Stop: 1240    Procedures:       ESOPHAGOGASTRODUODENOSCOPY WITH BIOPSY X2 AREAS AND DILATION (BOUGIE #54) (N/A )      COLONOSCOPY WITH BIOPSY X1 AREA  AND POLYPECTOMY X1 (N/A ) Diagnosis:       Dysphagia      GERD (gastroesophageal reflux disease)      Diarrhea      Change in bowel habits      (Dysphagia [R13.10])      (GERD (gastroesophageal reflux disease) [K21.9])      (Diarrhea [R19.7])      (Change in bowel habits [R19.4])    Surgeons: Fredrick Alvarez MD Provider: Sachin Rosario MD    Anesthesia Type: MAC ASA Status: 4          Anesthesia Type: MAC    Vitals  Vitals Value Taken Time   /55 03/21/22 1257   Temp     Pulse 87 03/21/22 1257   Resp 22 03/21/22 1245   SpO2 96 % 03/21/22 1257   Vitals shown include unvalidated device data.        Post Anesthesia Care and Evaluation    Patient location during evaluation: PACU  Patient participation: complete - patient participated  Level of consciousness: awake  Pain scale: See nurse's notes for pain score.  Pain management: adequate  Airway patency: patent  Anesthetic complications: No anesthetic complications  PONV Status: none  Cardiovascular status: acceptable  Respiratory status: acceptable  Hydration status: acceptable    Comments: Patient seen and examined postoperatively; vital signs stable; SpO2 greater than or equal to 90%; cardiopulmonary status stable; nausea/vomiting adequately controlled; pain adequately controlled; no apparent anesthesia complications; patient discharged from anesthesia care when discharge criteria were met

## 2022-03-22 LAB
LAB AP CASE REPORT: NORMAL
PATH REPORT.FINAL DX SPEC: NORMAL
PATH REPORT.GROSS SPEC: NORMAL

## 2022-05-13 ENCOUNTER — OFFICE (OUTPATIENT)
Dept: URBAN - METROPOLITAN AREA CLINIC 64 | Facility: CLINIC | Age: 32
End: 2022-05-13
Payer: COMMERCIAL

## 2022-05-13 VITALS
SYSTOLIC BLOOD PRESSURE: 110 MMHG | WEIGHT: 293 LBS | HEART RATE: 110 BPM | HEIGHT: 70 IN | DIASTOLIC BLOOD PRESSURE: 71 MMHG

## 2022-05-13 DIAGNOSIS — K21.9 GASTRO-ESOPHAGEAL REFLUX DISEASE WITHOUT ESOPHAGITIS: ICD-10-CM

## 2022-05-13 PROCEDURE — 99213 OFFICE O/P EST LOW 20 MIN: CPT | Performed by: NURSE PRACTITIONER

## 2022-05-13 RX ORDER — COLESEVELAM HYDROCHLORIDE 625 MG/1
1250 TABLET, FILM COATED ORAL
Qty: 60 | Refills: 11 | Status: COMPLETED
Start: 2022-05-13 | End: 2022-07-08

## 2022-05-26 RX ORDER — HALOPERIDOL 2 MG/1
TABLET ORAL
Qty: 60 TABLET | Refills: 3 | OUTPATIENT
Start: 2022-05-26

## 2022-06-29 DIAGNOSIS — F31.60 BIPOLAR AFFECTIVE DISORDER, MIXED: ICD-10-CM

## 2022-06-29 RX ORDER — RISPERIDONE 0.5 MG/1
TABLET ORAL
Qty: 30 TABLET | Refills: 2 | OUTPATIENT
Start: 2022-06-29

## 2022-07-08 ENCOUNTER — OFFICE (OUTPATIENT)
Dept: URBAN - METROPOLITAN AREA CLINIC 64 | Facility: CLINIC | Age: 32
End: 2022-07-08
Payer: COMMERCIAL

## 2022-07-08 VITALS — HEIGHT: 70 IN | HEART RATE: 94 BPM | SYSTOLIC BLOOD PRESSURE: 112 MMHG | DIASTOLIC BLOOD PRESSURE: 72 MMHG

## 2022-07-08 DIAGNOSIS — K52.9 NONINFECTIVE GASTROENTERITIS AND COLITIS, UNSPECIFIED: ICD-10-CM

## 2022-07-08 PROCEDURE — 99213 OFFICE O/P EST LOW 20 MIN: CPT | Performed by: NURSE PRACTITIONER

## 2022-07-08 RX ORDER — DICYCLOMINE HYDROCHLORIDE 10 MG/1
CAPSULE ORAL
Qty: 60 | Refills: 1 | Status: ACTIVE
Start: 2022-07-08

## 2022-07-08 RX ORDER — COLESEVELAM HYDROCHLORIDE 625 MG/1
1250 TABLET, FILM COATED ORAL
Qty: 60 | Refills: 11 | Status: ACTIVE
Start: 2022-07-08

## 2022-07-08 RX ORDER — HYDROCORTISONE ACETATE 25 MG/1
SUPPOSITORY RECTAL
Qty: 10 | Refills: 2 | Status: COMPLETED
Start: 2022-07-08 | End: 2023-05-26

## 2023-03-27 ENCOUNTER — OFFICE VISIT (OUTPATIENT)
Dept: FAMILY MEDICINE CLINIC | Facility: CLINIC | Age: 33
End: 2023-03-27
Payer: MEDICAID

## 2023-03-27 ENCOUNTER — PATIENT ROUNDING (BHMG ONLY) (OUTPATIENT)
Dept: FAMILY MEDICINE CLINIC | Facility: CLINIC | Age: 33
End: 2023-03-27
Payer: MEDICAID

## 2023-03-27 VITALS
OXYGEN SATURATION: 95 % | BODY MASS INDEX: 41.95 KG/M2 | HEIGHT: 70 IN | WEIGHT: 293 LBS | SYSTOLIC BLOOD PRESSURE: 118 MMHG | DIASTOLIC BLOOD PRESSURE: 72 MMHG | HEART RATE: 98 BPM | TEMPERATURE: 97.4 F

## 2023-03-27 DIAGNOSIS — G89.29 CHRONIC PAIN OF BOTH KNEES: ICD-10-CM

## 2023-03-27 DIAGNOSIS — R71.8 ABNORMAL RBC: ICD-10-CM

## 2023-03-27 DIAGNOSIS — F41.9 ANXIETY: ICD-10-CM

## 2023-03-27 DIAGNOSIS — Z00.00 PREVENTATIVE HEALTH CARE: ICD-10-CM

## 2023-03-27 DIAGNOSIS — E66.01 CLASS 3 SEVERE OBESITY DUE TO EXCESS CALORIES WITH SERIOUS COMORBIDITY AND BODY MASS INDEX (BMI) OF 50.0 TO 59.9 IN ADULT: ICD-10-CM

## 2023-03-27 DIAGNOSIS — M54.42 CHRONIC LEFT-SIDED LOW BACK PAIN WITH LEFT-SIDED SCIATICA: ICD-10-CM

## 2023-03-27 DIAGNOSIS — M25.562 CHRONIC PAIN OF BOTH KNEES: ICD-10-CM

## 2023-03-27 DIAGNOSIS — E11.43 TYPE 2 DIABETES MELLITUS WITH DIABETIC AUTONOMIC NEUROPATHY, WITHOUT LONG-TERM CURRENT USE OF INSULIN: ICD-10-CM

## 2023-03-27 DIAGNOSIS — M25.561 CHRONIC PAIN OF BOTH KNEES: ICD-10-CM

## 2023-03-27 DIAGNOSIS — F51.05 INSOMNIA DUE TO OTHER MENTAL DISORDER: Chronic | ICD-10-CM

## 2023-03-27 DIAGNOSIS — J30.9 ALLERGIC RHINITIS, UNSPECIFIED SEASONALITY, UNSPECIFIED TRIGGER: ICD-10-CM

## 2023-03-27 DIAGNOSIS — F99 INSOMNIA DUE TO OTHER MENTAL DISORDER: Chronic | ICD-10-CM

## 2023-03-27 DIAGNOSIS — E78.2 MIXED HYPERLIPIDEMIA: Primary | ICD-10-CM

## 2023-03-27 DIAGNOSIS — G89.29 CHRONIC LEFT-SIDED LOW BACK PAIN WITH LEFT-SIDED SCIATICA: ICD-10-CM

## 2023-03-27 RX ORDER — DICYCLOMINE HYDROCHLORIDE 10 MG/1
10 CAPSULE ORAL 2 TIMES DAILY
COMMUNITY

## 2023-03-27 RX ORDER — HYDROCODONE BITARTRATE AND ACETAMINOPHEN 5; 325 MG/1; MG/1
1 TABLET ORAL EVERY 6 HOURS PRN
COMMUNITY
End: 2023-03-27

## 2023-03-27 RX ORDER — BUPROPION HYDROCHLORIDE 300 MG/1
300 TABLET ORAL DAILY
COMMUNITY

## 2023-03-27 RX ORDER — SEMAGLUTIDE 1.34 MG/ML
1 INJECTION, SOLUTION SUBCUTANEOUS WEEKLY
COMMUNITY
End: 2023-03-27

## 2023-03-27 RX ORDER — HYDROCODONE BITARTRATE AND ACETAMINOPHEN 5; 325 MG/1; MG/1
TABLET ORAL
Qty: 180 TABLET | Refills: 0 | Status: SHIPPED | OUTPATIENT
Start: 2023-03-27

## 2023-03-27 RX ORDER — ONDANSETRON 4 MG/1
4 TABLET, FILM COATED ORAL EVERY 8 HOURS PRN
Qty: 30 TABLET | Refills: 2 | Status: SHIPPED | OUTPATIENT
Start: 2023-03-27

## 2023-03-27 RX ORDER — OMEPRAZOLE 40 MG/1
40 CAPSULE, DELAYED RELEASE ORAL DAILY
Qty: 90 CAPSULE | Refills: 2 | Status: SHIPPED | OUTPATIENT
Start: 2023-03-27

## 2023-03-27 RX ORDER — CHLORAL HYDRATE 500 MG
CAPSULE ORAL
COMMUNITY

## 2023-03-27 RX ORDER — CELECOXIB 100 MG/1
100 CAPSULE ORAL 2 TIMES DAILY PRN
COMMUNITY
End: 2023-03-27 | Stop reason: SDUPTHER

## 2023-03-27 RX ORDER — COLESEVELAM 180 1/1
1875 TABLET ORAL 2 TIMES DAILY WITH MEALS
COMMUNITY

## 2023-03-27 RX ORDER — ATORVASTATIN CALCIUM 10 MG/1
10 TABLET, FILM COATED ORAL DAILY
Qty: 90 TABLET | Refills: 1 | Status: SHIPPED | OUTPATIENT
Start: 2023-03-27

## 2023-03-27 RX ORDER — ASPIRIN 325 MG
325 TABLET ORAL DAILY
COMMUNITY
End: 2023-03-27

## 2023-03-27 RX ORDER — LISINOPRIL 10 MG/1
10 TABLET ORAL DAILY
Qty: 90 TABLET | Refills: 2 | Status: SHIPPED | OUTPATIENT
Start: 2023-03-27

## 2023-03-27 RX ORDER — ATORVASTATIN CALCIUM 10 MG/1
10 TABLET, FILM COATED ORAL DAILY
COMMUNITY
End: 2023-03-27 | Stop reason: SDUPTHER

## 2023-03-27 RX ORDER — LANCETS
1 EACH MISCELLANEOUS 2 TIMES DAILY
Qty: 100 EACH | Refills: 6 | Status: SHIPPED | OUTPATIENT
Start: 2023-03-27 | End: 2023-03-30 | Stop reason: SDUPTHER

## 2023-03-27 RX ORDER — SEMAGLUTIDE 2.68 MG/ML
2 INJECTION, SOLUTION SUBCUTANEOUS
Qty: 4 ML | Refills: 6 | Status: SHIPPED | OUTPATIENT
Start: 2023-03-27

## 2023-03-27 RX ORDER — ALBUTEROL SULFATE 90 UG/1
2 AEROSOL, METERED RESPIRATORY (INHALATION) EVERY 4 HOURS PRN
Qty: 18 G | Refills: 2 | Status: SHIPPED | OUTPATIENT
Start: 2023-03-27

## 2023-03-27 RX ORDER — FOLIC ACID 1 MG/1
1 TABLET ORAL DAILY
COMMUNITY

## 2023-03-27 RX ORDER — CELECOXIB 100 MG/1
100 CAPSULE ORAL 2 TIMES DAILY PRN
Qty: 180 CAPSULE | Refills: 2 | Status: SHIPPED | OUTPATIENT
Start: 2023-03-27

## 2023-03-27 RX ORDER — ONDANSETRON 4 MG/1
4 TABLET, FILM COATED ORAL EVERY 8 HOURS PRN
COMMUNITY
End: 2023-03-27 | Stop reason: SDUPTHER

## 2023-03-27 RX ORDER — ASPIRIN 325 MG
325 TABLET, DELAYED RELEASE (ENTERIC COATED) ORAL EVERY 6 HOURS PRN
Qty: 90 TABLET | Refills: 1 | Status: SHIPPED | OUTPATIENT
Start: 2023-03-27

## 2023-03-27 RX ORDER — GLIMEPIRIDE 2 MG/1
2 TABLET ORAL
Qty: 90 TABLET | Refills: 1 | Status: SHIPPED | OUTPATIENT
Start: 2023-03-27

## 2023-03-27 RX ORDER — MULTIVIT WITH MINERALS/LUTEIN
1000 TABLET ORAL DAILY
COMMUNITY

## 2023-03-27 RX ORDER — PHENTERMINE HYDROCHLORIDE 30 MG/1
30 CAPSULE ORAL EVERY MORNING
COMMUNITY

## 2023-03-27 RX ORDER — TIZANIDINE 2 MG/1
2 TABLET ORAL EVERY 8 HOURS SCHEDULED
Qty: 90 TABLET | Refills: 1 | Status: SHIPPED | OUTPATIENT
Start: 2023-03-27

## 2023-03-27 RX ORDER — LISINOPRIL 10 MG/1
10 TABLET ORAL DAILY
COMMUNITY
End: 2023-03-27 | Stop reason: SDUPTHER

## 2023-03-27 NOTE — PROGRESS NOTES
Patti Oquendo is a 33 y.o. female.     History of Present Illness  33-year-old white female who vapes with history of cholecystectomy, LEEP surgery, chronic bilateral knee pain, chronic low back pain, bipolar disorder, panic disorder/anxiety, PTSD and asthma who comes in today to be established as a new patient    Blood pressure 118/72 heart rate 98 she denies any chest pain, dyspnea or tachycardia without a panic attack.    Patient currently on several medications for diabetes.  Her last fasting blood sugar was 92 A1c 6.5 to glycerides 183.  I am increasing her Ozempic to 2 mg and will take her off the metformin.  I instructed her to take the 2 mg of Amaryl every morning and if blood sugars do not stay below 200 to take 4 mg in the morning.  We will follow-up in 1 to 2 months.    Patient has been on Adipex for 5 or 6 months now along with the Ozempic for weight loss.  She has lost about 50 pounds however I am going to take her off the Adipex for a couple months and hopefully with increasing the Ozempic and her dieting her weight loss will continue.  We will place her back on Adipex on next visit    She also complains of bilateral knee pain and is on Norco for that.  She has not had current x-rays and apparently a previous PCP tried to inject her knees that was not successful.  We will get x-rays today and probably refer her to Ortho.  She also complains of low back pain on the left side goes into the hip and will be doing lumbar x-ray today as well.  She is also on Celebrex twice a day    Weight is 365 with a BMI of 52.3.  She has not been vaccinated for COVID need to schedule an eye exam          Fasting blood work  Lumbar/bilateral knee x-rays  Increase Ozempic to 2 mg weekly  Stop metformin  Stop Adipex  Reconsider COVID-vaccine  Schedule eye exam  Continue diet and exercise  Follow-up 2 months           The following portions of the patient's history were reviewed and updated as appropriate: allergies,  "current medications, past family history, past medical history, past social history, past surgical history and problem list.    Vitals:    23 1109   BP: 118/72   BP Location: Left arm   Patient Position: Sitting   Cuff Size: Large Adult   Pulse: 98   Temp: 97.4 °F (36.3 °C)   TempSrc: Oral   SpO2: 95%   Weight: (!) 165 kg (364 lb 12.8 oz)   Height: 177.8 cm (70\")     Body mass index is 52.34 kg/m².    Past Medical History:   Diagnosis Date   • Anxiety    • Bipolar disorder (HCC)    • Chronic bronchitis (HCC)    • Depression    • DJD (degenerative joint disease)    • Obese    • Panic disorder     Abstraction from [facility] Psycosis   • PTSD (post-traumatic stress disorder)    • Sleep apnea    • Thyroid nodule      Past Surgical History:   Procedure Laterality Date   • ABDOMINAL SURGERY      LEEP procedure   •  SECTION  , 2015    x 2   • CHOLECYSTECTOMY     • COLONOSCOPY N/A 3/21/2022    Procedure: COLONOSCOPY WITH BIOPSY X1 AREA  AND POLYPECTOMY X1;  Surgeon: Fredrick Alvarez MD;  Location: [facility] ENDOSCOPY;  Service: Gastroenterology;  Laterality: N/A;  POST: COLON POLYP    • ENDOSCOPY N/A 3/21/2022    Procedure: ESOPHAGOGASTRODUODENOSCOPY WITH BIOPSY X2 AREAS AND DILATION (BOUGIE #54);  Surgeon: Fredrick Alvarez MD;  Location: [facility] ENDOSCOPY;  Service: Gastroenterology;  Laterality: N/A;  POST: GASTRITIS   • GASTRECTOMY       Family History   Problem Relation Age of Onset   • Diabetes Other    • Hypertension Other    • Cancer Other         Colon, Breast, Uterine Cancer   • Hypertension Mother    • Heart attack Mother 43         age 43   • Cancer Mother         colon   • Obesity Father    • Hypertension Father 50   • No Known Problems Sister    • No Known Problems Brother    • No Known Problems Maternal Grandmother    • No Known Problems Maternal Grandfather    • Obesity Paternal Grandmother    • Diabetes Paternal Grandmother    • Hypertension Paternal Grandmother    • Stroke " Paternal Grandmother         x3 - 50's   • Sleep apnea Paternal Grandmother      Immunization History   Administered Date(s) Administered   • DTP 1990, 02/26/1991, 08/06/1991, 08/11/1994   • Flu Vaccine Intradermal Quad 18-64YR 11/19/2018   • H1N1 Inj Preservative Free 10/20/2009   • Hep B, Adolescent or Pediatric 10/09/2002, 12/04/2002   • Hib (HbOC) 08/06/1991   • IPV 10/09/2002   • MMR 01/07/1992, 03/05/2002   • OPV 1990, 02/26/1991, 06/23/1992, 08/11/1994   • Td 10/09/2002   • Tdap 01/20/2015, 11/05/2017       Admission on 03/21/2022, Discharged on 03/21/2022   Component Date Value Ref Range Status   • Glucose 03/21/2022 161 (H)  70 - 105 mg/dL Final    Serial Number: [id number]:  [id number]   • Case Report 03/21/2022    Final                    Value:Surgical Pathology Report                         Case: [id number]                                  Authorizing Provider:  Fredrick Alvarez MD        Collected:           03/21/2022 12:12 PM          Ordering Location:     [facility]  Received:            03/21/2022 01:33 PM                                 SUITES                                                                       Pathologist:           Boom Lopez MD                                                            Specimens:   1) - Small Intestine, R/O DIARRHEA                                                                  2) - Gastric, Antrum, R/O H.PYLORI                                                                  3) - Large Intestine, RANDOM COLON BIOPSY AND TRANSVERS COLON POLYP                       • Final Diagnosis 03/21/2022    Final                    Value:This result contains rich text formatting which cannot be displayed here.   • Gross Description 03/21/2022    Final                    Value:This result contains rich text formatting which cannot be displayed here.         Review of Systems   Constitutional: Negative.    HENT: Negative.     Respiratory: Negative.    Cardiovascular: Negative.    Gastrointestinal: Negative.    Genitourinary: Negative.    Musculoskeletal: Positive for back pain.        Knee pain   Skin: Negative.    Neurological: Negative.    Psychiatric/Behavioral: Negative.        Objective   Physical Exam  Constitutional:       Appearance: Normal appearance.   HENT:      Head: Normocephalic.   Cardiovascular:      Rate and Rhythm: Normal rate and regular rhythm.      Pulses: Normal pulses.   Pulmonary:      Effort: Pulmonary effort is normal.   Abdominal:      General: Bowel sounds are normal.   Musculoskeletal:         General: Normal range of motion.   Skin:     General: Skin is warm and dry.   Neurological:      General: No focal deficit present.      Mental Status: She is alert and oriented to person, place, and time.   Psychiatric:         Mood and Affect: Mood normal.         Behavior: Behavior normal.         Procedures    Assessment & Plan   Diagnoses and all orders for this visit:    1. Mixed hyperlipidemia (Primary)  -     Lipid Panel With LDL / HDL Ratio    2. Abnormal RBC  -     CBC & Differential    3. Preventative health care    4. Chronic pain of both knees  -     XR Knee 1 or 2 View Bilateral (In Office)    5. Chronic left-sided low back pain with left-sided sciatica  -     XR Spine Lumbar 2 or 3 View    6. Type 2 diabetes mellitus with diabetic autonomic neuropathy, without long-term current use of insulin ([facility])  -     Comprehensive Metabolic Panel  -     Hemoglobin A1c    7. Allergic rhinitis, unspecified seasonality, unspecified trigger  -     albuterol sulfate  (90 Base) MCG/ACT inhaler; Inhale 2 puffs Every 4 (Four) Hours As Needed for Wheezing.  Dispense: 18 g; Refill: 2    8. Class 3 severe obesity due to excess calories with serious comorbidity and body mass index (BMI) of 50.0 to 59.9 in adult ([facility])    9. Anxiety    10. Insomnia due to other mental disorder    Other orders  -     Semaglutide, 2  MG/DOSE, (Ozempic, 2 MG/DOSE,) 8 MG/3ML solution pen-injector; Inject 2 mg under the skin into the appropriate area as directed Every 7 (Seven) Days.  Dispense: 4 mL; Refill: 6  -     glimepiride (Amaryl) 2 MG tablet; Take 1 tablet by mouth Every Morning Before Breakfast.  Dispense: 90 tablet; Refill: 1  -     Accu-Chek Softclix Lancets lancets; 1 each by Other route 2 (Two) Times a Day. Test blood sugar  Dispense: 100 each; Refill: 6  -     atorvastatin (LIPITOR) 10 MG tablet; Take 1 tablet by mouth Daily.  Dispense: 90 tablet; Refill: 1  -     celecoxib (CeleBREX) 100 MG capsule; Take 1 capsule by mouth 2 (Two) Times a Day As Needed for Mild Pain.  Dispense: 180 capsule; Refill: 2  -     lisinopril (PRINIVIL,ZESTRIL) 10 MG tablet; Take 1 tablet by mouth Daily.  Dispense: 90 tablet; Refill: 2  -     omeprazole (priLOSEC) 40 MG capsule; Take 1 capsule by mouth Daily.  Dispense: 90 capsule; Refill: 2  -     ondansetron (ZOFRAN) 4 MG tablet; Take 1 tablet by mouth Every 8 (Eight) Hours As Needed for Nausea or Vomiting.  Dispense: 30 tablet; Refill: 2  -     tiZANidine (ZANAFLEX) 2 MG tablet; Take 1 tablet by mouth Every 8 (Eight) Hours.  Dispense: 90 tablet; Refill: 1  -     aspirin EC (aspirin) 325 MG tablet; Take 1 tablet by mouth Every 6 (Six) Hours As Needed for Mild Pain.  Dispense: 90 tablet; Refill: 1  -     HYDROcodone-acetaminophen (NORCO) 5-325 MG per tablet; One tab twice a day  Dispense: 180 tablet; Refill: 0          Current Outpatient Medications:   •  Accu-Chek Softclix Lancets lancets, 1 each by Other route 2 (Two) Times a Day. Test blood sugar, Disp: 100 each, Rfl: 6  •  albuterol sulfate  (90 Base) MCG/ACT inhaler, Inhale 2 puffs Every 4 (Four) Hours As Needed for Wheezing., Disp: 18 g, Rfl: 2  •  atorvastatin (LIPITOR) 10 MG tablet, Take 1 tablet by mouth Daily., Disp: 90 tablet, Rfl: 1  •  B Complex Vitamins (VITAMIN B COMPLEX PO), Take 1 each by mouth Daily., Disp: , Rfl:   •  Cariprazine  HCl (Vraylar) 4.5 MG capsule, Vraylar 4.5 mg capsule  TAKE ONE CAPSULE BY MOUTH ONCE DAILY, Disp: , Rfl:   •  celecoxib (CeleBREX) 100 MG capsule, Take 1 capsule by mouth 2 (Two) Times a Day As Needed for Mild Pain., Disp: 180 capsule, Rfl: 2  •  cholecalciferol (VITAMIN D3) 1.25 MG (49638 UT) capsule, Take 1 capsule by mouth Daily., Disp: , Rfl:   •  colesevelam (WELCHOL) 625 MG tablet, Take 3 tablets by mouth 2 (Two) Times a Day With Meals., Disp: , Rfl:   •  dicyclomine (BENTYL) 10 MG capsule, Take 1 capsule by mouth 2 (Two) Times a Day., Disp: , Rfl:   •  folic acid (FOLVITE) 1 MG tablet, Take 1 tablet by mouth Daily., Disp: , Rfl:   •  hydrOXYzine (ATARAX) 25 MG tablet, TAKE ONE TABLET BY MOUTH THREE TIMES DAILY AS NEEDED FOR ANXIETY (Patient taking differently: Take 2 tablets by mouth Every 8 (Eight) Hours As Needed.), Disp: 90 tablet, Rfl: 0  •  Jardiance 10 MG tablet tablet, Take 1 tablet by mouth Daily., Disp: , Rfl:   •  lisinopril (PRINIVIL,ZESTRIL) 10 MG tablet, Take 1 tablet by mouth Daily., Disp: 90 tablet, Rfl: 2  •  metFORMIN (GLUCOPHAGE) 1000 MG tablet, Take 1 tablet by mouth 2 (Two) Times a Day., Disp: , Rfl:   •  metFORMIN (GLUCOPHAGE) 1000 MG tablet, Take 1 tablet by mouth 2 (Two) Times a Day With Meals., Disp: , Rfl:   •  Omega-3 Fatty Acids (fish oil) 1000 MG capsule capsule, Take  by mouth Daily With Breakfast., Disp: , Rfl:   •  omeprazole (priLOSEC) 40 MG capsule, Take 1 capsule by mouth Daily., Disp: 90 capsule, Rfl: 2  •  ondansetron (ZOFRAN) 4 MG tablet, Take 1 tablet by mouth Every 8 (Eight) Hours As Needed for Nausea or Vomiting., Disp: 30 tablet, Rfl: 2  •  phentermine 30 MG capsule, Take 1 capsule by mouth Every Morning., Disp: , Rfl:   •  tiZANidine (ZANAFLEX) 2 MG tablet, Take 1 tablet by mouth Every 8 (Eight) Hours., Disp: 90 tablet, Rfl: 1  •  vitamin E 1000 UNIT capsule, Take 1 capsule by mouth Daily., Disp: , Rfl:   •  aspirin EC (aspirin) 325 MG tablet, Take 1 tablet by mouth  Every 6 (Six) Hours As Needed for Mild Pain., Disp: 90 tablet, Rfl: 1  •  Brexpiprazole (Rexulti) 2 MG tablet, Take 2 mg by mouth Daily., Disp: , Rfl:   •  buPROPion XL (WELLBUTRIN XL) 300 MG 24 hr tablet, Take 1 tablet by mouth Daily., Disp: , Rfl:   •  cholecalciferol (VITAMIN D3) 10 MCG (400 UNIT) tablet, Take 400 Units by mouth Daily., Disp: , Rfl:   •  Estarylla 0.25-35 MG-MCG per tablet, Take 1 tablet by mouth Daily., Disp: , Rfl:   •  glimepiride (Amaryl) 2 MG tablet, Take 1 tablet by mouth Every Morning Before Breakfast., Disp: 90 tablet, Rfl: 1  •  HYDROcodone-acetaminophen (NORCO) 5-325 MG per tablet, One tab twice a day, Disp: 180 tablet, Rfl: 0  •  nabumetone (RELAFEN) 500 MG tablet, Take 500 mg by mouth Every 12 (Twelve) Hours., Disp: , Rfl:   •  prazosin (MINIPRESS) 5 MG capsule, Take 10 mg by mouth every night at bedtime., Disp: , Rfl:   •  QUEtiapine (SEROquel) 300 MG tablet, Take 150 mg by mouth 2 (Two) Times a Day., Disp: , Rfl:   •  Semaglutide, 2 MG/DOSE, (Ozempic, 2 MG/DOSE,) 8 MG/3ML solution pen-injector, Inject 2 mg under the skin into the appropriate area as directed Every 7 (Seven) Days., Disp: 4 mL, Rfl: 6           Mala Diallo, ELPIDIO 3/27/2023 12:46 EDT  This note has been electronically signed

## 2023-03-27 NOTE — PATIENT INSTRUCTIONS
Fasting blood work  Lumbar/bilateral knee x-rays  Increase Ozempic to 2 mg weekly  Stop metformin  Stop Adipex  Reconsider COVID-vaccine  Schedule eye exam  Continue diet and exercise  Follow-up 2 months

## 2023-03-27 NOTE — PROGRESS NOTES
March 27, 2023    Hello, may I speak with Patti Oquendo?    My name is Angela      I am  with [facility]  [address] [facility] [address]  [place] [address].    Before we get started may I verify your date of birth? 1990    I am calling to officially welcome you to our practice and ask about your recent visit. Is this a good time to talk? yes    Tell me about your visit with us. What things went well?  It was fine.       We're always looking for ways to make our patients' experiences even better. Do you have recommendations on ways we may improve?  no    Overall were you satisfied with your first visit to our practice? yes       I appreciate you taking the time to speak with me today. Is there anything else I can do for you? no      Thank you, and have a great day.

## 2023-03-28 DIAGNOSIS — R79.89 ABNORMAL CBC: Primary | ICD-10-CM

## 2023-03-28 LAB
ALBUMIN SERPL-MCNC: 4.6 G/DL (ref 3.8–4.8)
ALBUMIN/GLOB SERPL: 1.8 {RATIO} (ref 1.2–2.2)
ALP SERPL-CCNC: 112 IU/L (ref 44–121)
ALT SERPL-CCNC: 42 IU/L (ref 0–32)
AST SERPL-CCNC: 20 IU/L (ref 0–40)
BASOPHILS # BLD AUTO: 0.1 X10E3/UL (ref 0–0.2)
BASOPHILS NFR BLD AUTO: 1 %
BILIRUB SERPL-MCNC: 0.2 MG/DL (ref 0–1.2)
BUN SERPL-MCNC: 11 MG/DL (ref 6–20)
BUN/CREAT SERPL: 17 (ref 9–23)
CALCIUM SERPL-MCNC: 10 MG/DL (ref 8.7–10.2)
CHLORIDE SERPL-SCNC: 102 MMOL/L (ref 96–106)
CHOLEST SERPL-MCNC: 151 MG/DL (ref 100–199)
CO2 SERPL-SCNC: 22 MMOL/L (ref 20–29)
CREAT SERPL-MCNC: 0.66 MG/DL (ref 0.57–1)
EGFRCR SERPLBLD CKD-EPI 2021: 119 ML/MIN/1.73
EOSINOPHIL # BLD AUTO: 0.3 X10E3/UL (ref 0–0.4)
EOSINOPHIL NFR BLD AUTO: 2 %
ERYTHROCYTE [DISTWIDTH] IN BLOOD BY AUTOMATED COUNT: 16.9 % (ref 11.7–15.4)
GLOBULIN SER CALC-MCNC: 2.6 G/DL (ref 1.5–4.5)
GLUCOSE SERPL-MCNC: 90 MG/DL (ref 70–99)
HBA1C MFR BLD: 7.3 % (ref 4.8–5.6)
HCT VFR BLD AUTO: 49.5 % (ref 34–46.6)
HDLC SERPL-MCNC: 38 MG/DL
HGB BLD-MCNC: 16.7 G/DL (ref 11.1–15.9)
IMM GRANULOCYTES # BLD AUTO: 0 X10E3/UL (ref 0–0.1)
IMM GRANULOCYTES NFR BLD AUTO: 0 %
LDLC SERPL CALC-MCNC: 87 MG/DL (ref 0–99)
LDLC/HDLC SERPL: 2.3 RATIO (ref 0–3.2)
LYMPHOCYTES # BLD AUTO: 3.6 X10E3/UL (ref 0.7–3.1)
LYMPHOCYTES NFR BLD AUTO: 26 %
MCH RBC QN AUTO: 27.8 PG (ref 26.6–33)
MCHC RBC AUTO-ENTMCNC: 33.7 G/DL (ref 31.5–35.7)
MCV RBC AUTO: 82 FL (ref 79–97)
MONOCYTES # BLD AUTO: 0.9 X10E3/UL (ref 0.1–0.9)
MONOCYTES NFR BLD AUTO: 6 %
NEUTROPHILS # BLD AUTO: 9.1 X10E3/UL (ref 1.4–7)
NEUTROPHILS NFR BLD AUTO: 65 %
PLATELET # BLD AUTO: 331 X10E3/UL (ref 150–450)
POTASSIUM SERPL-SCNC: 4.6 MMOL/L (ref 3.5–5.2)
PROT SERPL-MCNC: 7.2 G/DL (ref 6–8.5)
RBC # BLD AUTO: 6.01 X10E6/UL (ref 3.77–5.28)
SODIUM SERPL-SCNC: 141 MMOL/L (ref 134–144)
TRIGL SERPL-MCNC: 149 MG/DL (ref 0–149)
VLDLC SERPL CALC-MCNC: 26 MG/DL (ref 5–40)
WBC # BLD AUTO: 13.9 X10E3/UL (ref 3.4–10.8)

## 2023-05-26 ENCOUNTER — OFFICE (OUTPATIENT)
Dept: URBAN - METROPOLITAN AREA CLINIC 64 | Facility: CLINIC | Age: 33
End: 2023-05-26
Payer: COMMERCIAL

## 2023-05-26 VITALS
DIASTOLIC BLOOD PRESSURE: 57 MMHG | WEIGHT: 293 LBS | HEART RATE: 92 BPM | HEIGHT: 70 IN | SYSTOLIC BLOOD PRESSURE: 99 MMHG

## 2023-05-26 DIAGNOSIS — R14.0 ABDOMINAL DISTENSION (GASEOUS): ICD-10-CM

## 2023-05-26 DIAGNOSIS — R14.2 ERUCTATION: ICD-10-CM

## 2023-05-26 DIAGNOSIS — R19.7 DIARRHEA, UNSPECIFIED: ICD-10-CM

## 2023-05-26 DIAGNOSIS — R14.3 FLATULENCE: ICD-10-CM

## 2023-05-26 DIAGNOSIS — R11.2 NAUSEA WITH VOMITING, UNSPECIFIED: ICD-10-CM

## 2023-05-26 DIAGNOSIS — R74.8 ABNORMAL LEVELS OF OTHER SERUM ENZYMES: ICD-10-CM

## 2023-05-26 PROCEDURE — 99214 OFFICE O/P EST MOD 30 MIN: CPT | Performed by: NURSE PRACTITIONER

## 2023-05-26 RX ORDER — COLESTIPOL HYDROCHLORIDE 1 G/1
2 TABLET, FILM COATED ORAL
Qty: 60 | Refills: 11 | Status: COMPLETED
Start: 2023-05-26 | End: 2023-11-07

## 2023-11-07 ENCOUNTER — OFFICE (OUTPATIENT)
Dept: URBAN - METROPOLITAN AREA CLINIC 64 | Facility: CLINIC | Age: 33
End: 2023-11-07
Payer: COMMERCIAL

## 2023-11-07 VITALS
SYSTOLIC BLOOD PRESSURE: 111 MMHG | DIASTOLIC BLOOD PRESSURE: 61 MMHG | WEIGHT: 293 LBS | HEART RATE: 83 BPM | HEIGHT: 70 IN

## 2023-11-07 DIAGNOSIS — R74.8 ABNORMAL LEVELS OF OTHER SERUM ENZYMES: ICD-10-CM

## 2023-11-07 DIAGNOSIS — R15.2 FECAL URGENCY: ICD-10-CM

## 2023-11-07 DIAGNOSIS — K52.9 NONINFECTIVE GASTROENTERITIS AND COLITIS, UNSPECIFIED: ICD-10-CM

## 2023-11-07 PROCEDURE — 99214 OFFICE O/P EST MOD 30 MIN: CPT

## 2023-11-07 RX ORDER — DICYCLOMINE HYDROCHLORIDE 20 MG/1
60 TABLET ORAL
Qty: 270 | Refills: 3 | Status: ACTIVE
Start: 2023-11-07

## 2024-01-22 ENCOUNTER — OFFICE (OUTPATIENT)
Dept: URBAN - METROPOLITAN AREA CLINIC 64 | Facility: CLINIC | Age: 34
End: 2024-01-22
Payer: COMMERCIAL

## 2024-01-22 VITALS
HEART RATE: 59 BPM | SYSTOLIC BLOOD PRESSURE: 105 MMHG | DIASTOLIC BLOOD PRESSURE: 69 MMHG | WEIGHT: 293 LBS | HEIGHT: 70 IN

## 2024-01-22 DIAGNOSIS — R19.7 DIARRHEA, UNSPECIFIED: ICD-10-CM

## 2024-01-22 DIAGNOSIS — R15.2 FECAL URGENCY: ICD-10-CM

## 2024-01-22 PROCEDURE — 99214 OFFICE O/P EST MOD 30 MIN: CPT

## 2024-01-22 RX ORDER — DICYCLOMINE HYDROCHLORIDE 20 MG/1
60 TABLET ORAL
Qty: 270 | Refills: 3 | Status: ACTIVE
Start: 2023-11-07

## 2024-01-22 RX ORDER — COLESTIPOL HYDROCHLORIDE 1 G/1
4 TABLET, FILM COATED ORAL
Qty: 120 | Refills: 11 | Status: ACTIVE
Start: 2024-01-22

## (undated) DEVICE — BITEBLOCK ENDO W/STRAP 60F A/ LF DISP

## (undated) DEVICE — PK ENDO GI 50

## (undated) DEVICE — SINGLE-USE BIOPSY FORCEPS: Brand: RADIAL JAW 4